# Patient Record
Sex: FEMALE | Race: BLACK OR AFRICAN AMERICAN | NOT HISPANIC OR LATINO | Employment: FULL TIME | URBAN - METROPOLITAN AREA
[De-identification: names, ages, dates, MRNs, and addresses within clinical notes are randomized per-mention and may not be internally consistent; named-entity substitution may affect disease eponyms.]

---

## 2024-05-12 ENCOUNTER — HOSPITAL ENCOUNTER (INPATIENT)
Facility: HOSPITAL | Age: 29
LOS: 3 days | Discharge: HOME | DRG: 812 | End: 2024-05-15
Attending: EMERGENCY MEDICINE | Admitting: SPECIALIST
Payer: MEDICAID

## 2024-05-12 DIAGNOSIS — D57.00 SICKLE CELL ANEMIA WITH PAIN: Primary | ICD-10-CM

## 2024-05-12 PROBLEM — F12.90 MARIJUANA USE: Status: ACTIVE | Noted: 2024-05-12

## 2024-05-12 PROBLEM — D57.1 SICKLE CELL ANEMIA: Status: ACTIVE | Noted: 2024-05-12

## 2024-05-12 PROBLEM — F98.8 ADD (ATTENTION DEFICIT DISORDER): Status: ACTIVE | Noted: 2024-05-12

## 2024-05-12 LAB
ALBUMIN SERPL BCP-MCNC: 3.9 G/DL (ref 3.5–5.2)
ALP SERPL-CCNC: 82 U/L (ref 55–135)
ALT SERPL W/O P-5'-P-CCNC: 10 U/L (ref 10–44)
ANION GAP SERPL CALC-SCNC: 8 MMOL/L (ref 8–16)
AST SERPL-CCNC: 23 U/L (ref 10–40)
B-HCG UR QL: NEGATIVE
BASOPHILS # BLD AUTO: 0.13 K/UL (ref 0–0.2)
BASOPHILS NFR BLD: 1.2 % (ref 0–1.9)
BILIRUB SERPL-MCNC: 3 MG/DL (ref 0.1–1)
BILIRUB UR QL STRIP: NEGATIVE
BUN SERPL-MCNC: 6 MG/DL (ref 6–20)
CALCIUM SERPL-MCNC: 9.3 MG/DL (ref 8.7–10.5)
CHLORIDE SERPL-SCNC: 106 MMOL/L (ref 95–110)
CLARITY UR: CLEAR
CO2 SERPL-SCNC: 25 MMOL/L (ref 23–29)
COLOR UR: YELLOW
CREAT SERPL-MCNC: 0.7 MG/DL (ref 0.5–1.4)
DIFFERENTIAL METHOD BLD: ABNORMAL
EOSINOPHIL # BLD AUTO: 0.3 K/UL (ref 0–0.5)
EOSINOPHIL NFR BLD: 2.7 % (ref 0–8)
ERYTHROCYTE [DISTWIDTH] IN BLOOD BY AUTOMATED COUNT: 14.4 % (ref 11.5–14.5)
EST. GFR  (NO RACE VARIABLE): >60 ML/MIN/1.73 M^2
GLUCOSE SERPL-MCNC: 87 MG/DL (ref 70–110)
GLUCOSE UR QL STRIP: NEGATIVE
HCG INTACT+B SERPL-ACNC: <1.2 MIU/ML
HCT VFR BLD AUTO: 31.2 % (ref 37–48.5)
HGB BLD-MCNC: 11.7 G/DL (ref 12–16)
HGB UR QL STRIP: NEGATIVE
IMM GRANULOCYTES # BLD AUTO: 0.05 K/UL (ref 0–0.04)
IMM GRANULOCYTES NFR BLD AUTO: 0.5 % (ref 0–0.5)
KETONES UR QL STRIP: NEGATIVE
LDH SERPL L TO P-CCNC: 304 U/L (ref 110–260)
LEUKOCYTE ESTERASE UR QL STRIP: NEGATIVE
LYMPHOCYTES # BLD AUTO: 4.5 K/UL (ref 1–4.8)
LYMPHOCYTES NFR BLD: 42.9 % (ref 18–48)
MCH RBC QN AUTO: 30.2 PG (ref 27–31)
MCHC RBC AUTO-ENTMCNC: 37.5 G/DL (ref 32–36)
MCV RBC AUTO: 80 FL (ref 82–98)
MONOCYTES # BLD AUTO: 1.1 K/UL (ref 0.3–1)
MONOCYTES NFR BLD: 10.2 % (ref 4–15)
NEUTROPHILS # BLD AUTO: 4.4 K/UL (ref 1.8–7.7)
NEUTROPHILS NFR BLD: 42.5 % (ref 38–73)
NITRITE UR QL STRIP: NEGATIVE
NRBC BLD-RTO: 1 /100 WBC
PH UR STRIP: 5 [PH] (ref 5–8)
PLATELET # BLD AUTO: 243 K/UL (ref 150–450)
PMV BLD AUTO: 9.5 FL (ref 9.2–12.9)
POTASSIUM SERPL-SCNC: 3.4 MMOL/L (ref 3.5–5.1)
PROT SERPL-MCNC: 7.4 G/DL (ref 6–8.4)
PROT UR QL STRIP: NEGATIVE
RBC # BLD AUTO: 3.88 M/UL (ref 4–5.4)
RETICS/RBC NFR AUTO: 4.1 % (ref 0.5–2.5)
SODIUM SERPL-SCNC: 139 MMOL/L (ref 136–145)
SP GR UR STRIP: 1.01 (ref 1–1.03)
URN SPEC COLLECT METH UR: NORMAL
UROBILINOGEN UR STRIP-ACNC: NEGATIVE EU/DL
WBC # BLD AUTO: 10.41 K/UL (ref 3.9–12.7)

## 2024-05-12 PROCEDURE — 99285 EMERGENCY DEPT VISIT HI MDM: CPT | Mod: 25

## 2024-05-12 PROCEDURE — 63600175 PHARM REV CODE 636 W HCPCS

## 2024-05-12 PROCEDURE — 11000001 HC ACUTE MED/SURG PRIVATE ROOM

## 2024-05-12 PROCEDURE — 25000003 PHARM REV CODE 250: Performed by: EMERGENCY MEDICINE

## 2024-05-12 PROCEDURE — 80053 COMPREHEN METABOLIC PANEL: CPT | Performed by: EMERGENCY MEDICINE

## 2024-05-12 PROCEDURE — S5010 5% DEXTROSE AND 0.45% SALINE: HCPCS

## 2024-05-12 PROCEDURE — 83615 LACTATE (LD) (LDH) ENZYME: CPT | Performed by: EMERGENCY MEDICINE

## 2024-05-12 PROCEDURE — 96374 THER/PROPH/DIAG INJ IV PUSH: CPT

## 2024-05-12 PROCEDURE — 84702 CHORIONIC GONADOTROPIN TEST: CPT | Performed by: EMERGENCY MEDICINE

## 2024-05-12 PROCEDURE — 85045 AUTOMATED RETICULOCYTE COUNT: CPT | Performed by: EMERGENCY MEDICINE

## 2024-05-12 PROCEDURE — 25000003 PHARM REV CODE 250

## 2024-05-12 PROCEDURE — 96376 TX/PRO/DX INJ SAME DRUG ADON: CPT

## 2024-05-12 PROCEDURE — 96361 HYDRATE IV INFUSION ADD-ON: CPT

## 2024-05-12 PROCEDURE — 36415 COLL VENOUS BLD VENIPUNCTURE: CPT | Performed by: EMERGENCY MEDICINE

## 2024-05-12 PROCEDURE — 81003 URINALYSIS AUTO W/O SCOPE: CPT | Performed by: EMERGENCY MEDICINE

## 2024-05-12 PROCEDURE — 96375 TX/PRO/DX INJ NEW DRUG ADDON: CPT

## 2024-05-12 PROCEDURE — 85025 COMPLETE CBC W/AUTO DIFF WBC: CPT | Performed by: EMERGENCY MEDICINE

## 2024-05-12 PROCEDURE — 81025 URINE PREGNANCY TEST: CPT

## 2024-05-12 PROCEDURE — 63600175 PHARM REV CODE 636 W HCPCS: Performed by: EMERGENCY MEDICINE

## 2024-05-12 RX ORDER — GLUCAGON 1 MG
1 KIT INJECTION
Status: CANCELLED | OUTPATIENT
Start: 2024-05-12

## 2024-05-12 RX ORDER — OXYCODONE AND ACETAMINOPHEN 7.5; 325 MG/1; MG/1
1 TABLET ORAL EVERY 8 HOURS PRN
Status: ON HOLD | COMMUNITY
Start: 2024-05-02 | End: 2024-05-15 | Stop reason: HOSPADM

## 2024-05-12 RX ORDER — VALACYCLOVIR HYDROCHLORIDE 500 MG/1
500 TABLET, FILM COATED ORAL 2 TIMES DAILY
COMMUNITY
Start: 2023-08-06

## 2024-05-12 RX ORDER — FOLIC ACID 1 MG/1
1 TABLET ORAL DAILY
COMMUNITY

## 2024-05-12 RX ORDER — ACETAMINOPHEN 325 MG/1
650 TABLET ORAL EVERY 6 HOURS PRN
Status: DISCONTINUED | OUTPATIENT
Start: 2024-05-12 | End: 2024-05-15 | Stop reason: HOSPADM

## 2024-05-12 RX ORDER — ERGOCALCIFEROL 1.25 MG/1
50000 CAPSULE ORAL
COMMUNITY
Start: 2024-05-02

## 2024-05-12 RX ORDER — HYDROMORPHONE HCL IN 0.9% NACL 6 MG/30 ML
PATIENT CONTROLLED ANALGESIA SYRINGE INTRAVENOUS CONTINUOUS
Status: DISCONTINUED | OUTPATIENT
Start: 2024-05-12 | End: 2024-05-14

## 2024-05-12 RX ORDER — SODIUM CHLORIDE 0.9 % (FLUSH) 0.9 %
10 SYRINGE (ML) INJECTION EVERY 12 HOURS PRN
Status: CANCELLED | OUTPATIENT
Start: 2024-05-12

## 2024-05-12 RX ORDER — NALOXONE HCL 0.4 MG/ML
0.02 VIAL (ML) INJECTION
Status: CANCELLED | OUTPATIENT
Start: 2024-05-12

## 2024-05-12 RX ORDER — DEXTROSE MONOHYDRATE AND SODIUM CHLORIDE 5; .45 G/100ML; G/100ML
INJECTION, SOLUTION INTRAVENOUS CONTINUOUS
Status: DISCONTINUED | OUTPATIENT
Start: 2024-05-12 | End: 2024-05-15 | Stop reason: HOSPADM

## 2024-05-12 RX ORDER — HYDROMORPHONE HYDROCHLORIDE 2 MG/ML
1 INJECTION, SOLUTION INTRAMUSCULAR; INTRAVENOUS; SUBCUTANEOUS
Status: COMPLETED | OUTPATIENT
Start: 2024-05-12 | End: 2024-05-12

## 2024-05-12 RX ORDER — DIPHENHYDRAMINE HCL 25 MG
25 CAPSULE ORAL EVERY 6 HOURS PRN
Status: DISCONTINUED | OUTPATIENT
Start: 2024-05-12 | End: 2024-05-15 | Stop reason: HOSPADM

## 2024-05-12 RX ORDER — NALOXONE HCL 0.4 MG/ML
0.02 VIAL (ML) INJECTION
Status: DISCONTINUED | OUTPATIENT
Start: 2024-05-12 | End: 2024-05-15 | Stop reason: HOSPADM

## 2024-05-12 RX ORDER — MORPHINE SULFATE 4 MG/ML
4 INJECTION, SOLUTION INTRAMUSCULAR; INTRAVENOUS
Status: COMPLETED | OUTPATIENT
Start: 2024-05-12 | End: 2024-05-12

## 2024-05-12 RX ORDER — KETOROLAC TROMETHAMINE 30 MG/ML
15 INJECTION, SOLUTION INTRAMUSCULAR; INTRAVENOUS
Status: COMPLETED | OUTPATIENT
Start: 2024-05-12 | End: 2024-05-12

## 2024-05-12 RX ORDER — IBUPROFEN 200 MG
24 TABLET ORAL
Status: CANCELLED | OUTPATIENT
Start: 2024-05-12

## 2024-05-12 RX ORDER — ONDANSETRON HYDROCHLORIDE 2 MG/ML
4 INJECTION, SOLUTION INTRAVENOUS EVERY 12 HOURS PRN
Status: DISCONTINUED | OUTPATIENT
Start: 2024-05-12 | End: 2024-05-15

## 2024-05-12 RX ORDER — SODIUM CHLORIDE 0.9 % (FLUSH) 0.9 %
10 SYRINGE (ML) INJECTION
Status: DISCONTINUED | OUTPATIENT
Start: 2024-05-12 | End: 2024-05-15 | Stop reason: HOSPADM

## 2024-05-12 RX ORDER — AMOXICILLIN 250 MG
1 CAPSULE ORAL 2 TIMES DAILY
Status: DISCONTINUED | OUTPATIENT
Start: 2024-05-12 | End: 2024-05-15 | Stop reason: HOSPADM

## 2024-05-12 RX ORDER — IBUPROFEN 200 MG
16 TABLET ORAL
Status: CANCELLED | OUTPATIENT
Start: 2024-05-12

## 2024-05-12 RX ORDER — ONDANSETRON HYDROCHLORIDE 2 MG/ML
4 INJECTION, SOLUTION INTRAVENOUS
Status: COMPLETED | OUTPATIENT
Start: 2024-05-12 | End: 2024-05-12

## 2024-05-12 RX ORDER — OXYCODONE AND ACETAMINOPHEN 5; 325 MG/1; MG/1
1 TABLET ORAL EVERY 8 HOURS PRN
COMMUNITY
Start: 2024-03-21 | End: 2024-05-12 | Stop reason: DRUGHIGH

## 2024-05-12 RX ADMIN — SODIUM CHLORIDE 1000 ML: 9 INJECTION, SOLUTION INTRAVENOUS at 06:05

## 2024-05-12 RX ADMIN — ONDANSETRON 4 MG: 2 INJECTION INTRAMUSCULAR; INTRAVENOUS at 06:05

## 2024-05-12 RX ADMIN — Medication: at 02:05

## 2024-05-12 RX ADMIN — HYDROMORPHONE HYDROCHLORIDE 1 MG: 2 INJECTION INTRAMUSCULAR; INTRAVENOUS; SUBCUTANEOUS at 10:05

## 2024-05-12 RX ADMIN — SODIUM CHLORIDE, POTASSIUM CHLORIDE, SODIUM LACTATE AND CALCIUM CHLORIDE 1000 ML: 600; 310; 30; 20 INJECTION, SOLUTION INTRAVENOUS at 06:05

## 2024-05-12 RX ADMIN — DEXTROSE AND SODIUM CHLORIDE: 5; 450 INJECTION, SOLUTION INTRAVENOUS at 02:05

## 2024-05-12 RX ADMIN — KETOROLAC TROMETHAMINE 15 MG: 30 INJECTION, SOLUTION INTRAMUSCULAR at 07:05

## 2024-05-12 RX ADMIN — ONDANSETRON 4 MG: 2 INJECTION INTRAMUSCULAR; INTRAVENOUS at 02:05

## 2024-05-12 RX ADMIN — SODIUM CHLORIDE 1000 ML: 9 INJECTION, SOLUTION INTRAVENOUS at 09:05

## 2024-05-12 RX ADMIN — HYDROMORPHONE HYDROCHLORIDE 1 MG: 2 INJECTION INTRAMUSCULAR; INTRAVENOUS; SUBCUTANEOUS at 08:05

## 2024-05-12 RX ADMIN — MORPHINE SULFATE 4 MG: 4 INJECTION INTRAVENOUS at 06:05

## 2024-05-12 RX ADMIN — SENNOSIDES AND DOCUSATE SODIUM 1 TABLET: 50; 8.6 TABLET ORAL at 08:05

## 2024-05-12 RX ADMIN — ACETAMINOPHEN 650 MG: 325 TABLET ORAL at 06:05

## 2024-05-12 NOTE — ED PROVIDER NOTES
SCRIBE #1 NOTE: I, Elvira Leung, am scribing for, and in the presence of, Adrian Lam MD. I have scribed the entire note.       History     Chief Complaint   Patient presents with    Sickle Cell Pain Crisis     C/o right leg pain since yesterday      Review of patient's allergies indicates:  No Known Allergies      History of Present Illness     HPI    5/12/2024, 6:33 AM  History obtained from the patient      History of Present Illness: Francy Zuleta is a 29 y.o. female patient with a PMHx of sickle-cell anemia and ADD who presents to the Emergency Department for evaluation of sickle-cell pain which onset gradually within the past 24 hours. Pt took oxycodone 7.5 but with no relief. Pt denies being pregnant, having allergies, and knowing the flare-up cause. Pt's last ED visit for same CC was 02-13-24 at [facility]. Symptoms are constant and moderate in severity. No mitigating or exacerbating factors reported. Associated sxs include arm pain, leg pain, and back pain. Patient denies any fever, N/V/D, CP, SOB, abdominal pain, cough, congestion, HA, and all other sxs at this time. No further complaints or concerns at this time.       Arrival mode: Personal vehicle    PCP: No, Primary Doctor        Past Medical History:  No past medical history on file.    Past Surgical History:  No past surgical history on file.      Family History:  No family history on file.    Social History:  Social History     Tobacco Use    Smoking status: Not on file    Smokeless tobacco: Not on file   Substance and Sexual Activity    Alcohol use: Not on file    Drug use: Not on file    Sexual activity: Not on file        Review of Systems     Review of Systems   Constitutional:  Negative for fever.   HENT:  Negative for congestion and sore throat.    Respiratory:  Negative for cough and shortness of breath.    Cardiovascular:  Negative for chest pain.   Gastrointestinal:  Negative for abdominal pain, diarrhea, nausea and vomiting.  "  Genitourinary:  Negative for dysuria.   Musculoskeletal:  Positive for arthralgias (shoulders, arms, legs) and back pain.   Skin:  Negative for rash.   Allergic/Immunologic: Negative for environmental allergies.   Neurological:  Negative for weakness and headaches.   Hematological:  Does not bruise/bleed easily.   All other systems reviewed and are negative.       Physical Exam     Initial Vitals [05/12/24 0613]   BP Pulse Resp Temp SpO2   119/79 69 16 97.8 °F (36.6 °C) 98 %      MAP       --          Physical Exam  Nursing Notes and Vital Signs Reviewed.  Constitutional: Patient is in no acute distress. Well-developed and well-nourished. Appears achy.  Head: Atraumatic. Normocephalic.  Eyes: PERRL. EOM intact. Conjunctivae are not pale. No scleral icterus.  ENT: Mucous membranes are moist. Oropharynx is clear and symmetric.    Neck: Supple. Full ROM. No lymphadenopathy.  Cardiovascular: Regular rate. Regular rhythm. No murmurs, rubs, or gallops. Distal pulses are 2+ and symmetric.  Pulmonary/Chest: No respiratory distress. Clear to auscultation bilaterally. No wheezing or rales.  Abdominal: Soft and non-distended.  There is no tenderness.  No rebound, guarding, or rigidity.  Genitourinary: No CVA tenderness  Musculoskeletal: Moves all extremities. No obvious deformities. No edema.  Skin: Warm and dry. Tattoos on legs.  Neurological:  Alert, awake, and appropriate.  Normal speech.  No acute focal neurological deficits are appreciated.  Psychiatric: Normal affect. Good eye contact. Appropriate in content.     ED Course   Procedures  ED Vital Signs:  Vitals:    05/12/24 0613 05/12/24 0649 05/12/24 0733 05/12/24 0811   BP: 119/79  96/63    Pulse: 69  69    Resp: 16 18  18   Temp: 97.8 °F (36.6 °C)      TempSrc: Oral      SpO2: 98%  99%    Weight: 52 kg (114 lb 9.6 oz)      Height: 5' 2" (1.575 m)       05/12/24 0813 05/12/24 0932 05/12/24 1003 05/12/24 1033   BP: 96/61 99/65 101/67 100/69   Pulse: 67 63 69 62   Resp: " 16 16  18   Temp:       TempSrc:       SpO2: 98% 98% 97% 97%   Weight:       Height:        05/12/24 1052 05/12/24 1141   BP:  101/69   Pulse:  86   Resp: 18 18   Temp:  98 °F (36.7 °C)   TempSrc:  Oral   SpO2:  97%   Weight:     Height:         Abnormal Lab Results:  Labs Reviewed   CBC W/ AUTO DIFFERENTIAL - Abnormal; Notable for the following components:       Result Value    RBC 3.88 (*)     Hemoglobin 11.7 (*)     Hematocrit 31.2 (*)     MCV 80 (*)     MCHC 37.5 (*)     Immature Grans (Abs) 0.05 (*)     Mono # 1.1 (*)     nRBC 1 (*)     All other components within normal limits   COMPREHENSIVE METABOLIC PANEL - Abnormal; Notable for the following components:    Potassium 3.4 (*)     Total Bilirubin 3.0 (*)     All other components within normal limits   RETICULOCYTES - Abnormal; Notable for the following components:    Retic 4.1 (*)     All other components within normal limits   LACTATE DEHYDROGENASE - Abnormal; Notable for the following components:     (*)     All other components within normal limits        All Lab Results:  Results for orders placed or performed during the hospital encounter of 05/12/24   CBC Auto Differential   Result Value Ref Range    WBC 10.41 3.90 - 12.70 K/uL    RBC 3.88 (L) 4.00 - 5.40 M/uL    Hemoglobin 11.7 (L) 12.0 - 16.0 g/dL    Hematocrit 31.2 (L) 37.0 - 48.5 %    MCV 80 (L) 82 - 98 fL    MCH 30.2 27.0 - 31.0 pg    MCHC 37.5 (H) 32.0 - 36.0 g/dL    RDW 14.4 11.5 - 14.5 %    Platelets 243 150 - 450 K/uL    MPV 9.5 9.2 - 12.9 fL    Immature Granulocytes 0.5 0.0 - 0.5 %    Gran # (ANC) 4.4 1.8 - 7.7 K/uL    Immature Grans (Abs) 0.05 (H) 0.00 - 0.04 K/uL    Lymph # 4.5 1.0 - 4.8 K/uL    Mono # 1.1 (H) 0.3 - 1.0 K/uL    Eos # 0.3 0.0 - 0.5 K/uL    Baso # 0.13 0.00 - 0.20 K/uL    nRBC 1 (A) 0 /100 WBC    Gran % 42.5 38.0 - 73.0 %    Lymph % 42.9 18.0 - 48.0 %    Mono % 10.2 4.0 - 15.0 %    Eosinophil % 2.7 0.0 - 8.0 %    Basophil % 1.2 0.0 - 1.9 %    Differential Method  Automated    Comprehensive Metabolic Panel   Result Value Ref Range    Sodium 139 136 - 145 mmol/L    Potassium 3.4 (L) 3.5 - 5.1 mmol/L    Chloride 106 95 - 110 mmol/L    CO2 25 23 - 29 mmol/L    Glucose 87 70 - 110 mg/dL    BUN 6 6 - 20 mg/dL    Creatinine 0.7 0.5 - 1.4 mg/dL    Calcium 9.3 8.7 - 10.5 mg/dL    Total Protein 7.4 6.0 - 8.4 g/dL    Albumin 3.9 3.5 - 5.2 g/dL    Total Bilirubin 3.0 (H) 0.1 - 1.0 mg/dL    Alkaline Phosphatase 82 55 - 135 U/L    AST 23 10 - 40 U/L    ALT 10 10 - 44 U/L    eGFR >60 >60 mL/min/1.73 m^2    Anion Gap 8 8 - 16 mmol/L   Reticulocytes   Result Value Ref Range    Retic 4.1 (H) 0.5 - 2.5 %   Urinalysis, Reflex to Urine Culture Urine, Clean Catch    Specimen: Urine   Result Value Ref Range    Specimen UA Urine, Clean Catch     Color, UA Yellow Yellow, Straw, Adelina    Appearance, UA Clear Clear    pH, UA 5.0 5.0 - 8.0    Specific Gravity, UA 1.015 1.005 - 1.030    Protein, UA Negative Negative    Glucose, UA Negative Negative    Ketones, UA Negative Negative    Bilirubin (UA) Negative Negative    Occult Blood UA Negative Negative    Nitrite, UA Negative Negative    Urobilinogen, UA Negative <2.0 EU/dL    Leukocytes, UA Negative Negative   Lactate Dehydrogenase   Result Value Ref Range     (H) 110 - 260 U/L         Imaging Results:  Imaging Results    None          No EKG ordered.           The Emergency Provider reviewed the vital signs and test results, which are outlined above.     ED Discussion       10:11 AM: Discussed case with Dr. Jones ([facility]). Dr. Jones agrees with current care and management of pt and accepts admission.    Admitting Service: [facility]  Admitting Physician: Dr. Jones  Admit to: Med Surg    10:11 AM: Re-evaluated pt. I have discussed test results, shared treatment plan, and the need for admission with patient and family at bedside. Pt and family express understanding at this time and agree with all information. All  questions answered. Pt and family have no further questions or concerns at this time. Pt is ready for admit.        Medical Decision Making  Amount and/or Complexity of Data Reviewed  External Data Reviewed: notes.     Details: [facility] ED visit on 02-13-24 to confirm last ED visit with same CC and PMHx.  Labs: ordered. Decision-making details documented in ED Course.    Risk  Prescription drug management.  Decision regarding hospitalization.                ED Medication(s):  Medications   sodium chloride 0.9% flush 10 mL (has no administration in time range)   dextrose 5 % and 0.45 % NaCl infusion (has no administration in time range)   senna-docusate 8.6-50 mg per tablet 1 tablet (has no administration in time range)   naloxone 0.4 mg/mL injection 0.02 mg (has no administration in time range)   HYDROmorphone PCA syringe 6 mg/30 mL (0.2 mg/mL) NS (has no administration in time range)   ondansetron injection 4 mg (has no administration in time range)   diphenhydrAMINE capsule 25 mg (has no administration in time range)   morphine injection 4 mg (4 mg Intravenous Given 5/12/24 0649)   ondansetron injection 4 mg (4 mg Intravenous Given 5/12/24 0650)   sodium chloride 0.9% bolus 1,000 mL 1,000 mL (0 mLs Intravenous Stopped 5/12/24 0811)   ketorolac injection 15 mg (15 mg Intravenous Given 5/12/24 0700)   HYDROmorphone (PF) injection 1 mg (1 mg Intravenous Given 5/12/24 0811)   sodium chloride 0.9% bolus 1,000 mL 1,000 mL (0 mLs Intravenous Stopped 5/12/24 1056)   HYDROmorphone (PF) injection 1 mg (1 mg Intravenous Given 5/12/24 1052)       Current Discharge Medication List                  Scribe Attestation:   Scribe #1: I performed the above scribed service and the documentation accurately describes the services I performed. I attest to the accuracy of the note.     Attending:   Physician Attestation Statement for Scribe #1: Do PALACIOS Thuytien Wendy, MD, personally performed the services described in this documentation, as  scribed by Elvira Leung, in my presence, and it is both accurate and complete.           Clinical Impression       ICD-10-CM ICD-9-CM   1. Sickle cell anemia with pain  D57.00 282.62       Disposition:   Disposition: Admitted  Condition: Adrian Mejia Do, MD  05/12/24 4848

## 2024-05-12 NOTE — H&P
[facility] Medicine  History & Physical    Patient Name: Francy Zuleta  MRN: [id number]  Patient Class: OP- Observation  Admission Date: 2024  Attending Physician: Ana Jones MD   Primary Care Provider: Maddie Primary Doctor         Patient information was obtained from patient and ER records.     Subjective:     Principal Problem:Sickle cell anemia with pain    Chief Complaint:   Chief Complaint   Patient presents with    Sickle Cell Pain Crisis     C/o right leg pain since yesterday         HPI: Francy Zuleta a 29 year old female with a past medical history of sickle cell anemia and ADD presents to the ER with complaints of sickle cell crisis pain. Patient reports right leg pain since yesterday, rates 10/10 and describes the pain as constant and stabbing. Patient reports pain is made worse with ambulation and only relieved with pain medication.   Labs: Hemoglobin 11.7, hematocrit 31.2, Retic 4.1, Potassium 3.4,   UA pending   Beta HCG negative   Hospital medicine will admit the patient for obs  Past surgical history includes :  in , D&C in      Heme/Onc Doctor: Savita Arthur NP     No past medical history on file.    No past surgical history on file.    Review of patient's allergies indicates:  No Known Allergies    No current facility-administered medications on file prior to encounter.     Current Outpatient Medications on File Prior to Encounter   Medication Sig    ergocalciferol (ERGOCALCIFEROL) 50,000 unit Cap Take 50,000 Units by mouth every 7 days.    folic acid (FOLVITE) 1 MG tablet Take 1 mg by mouth once daily.    oxyCODONE-acetaminophen (PERCOCET) 7.5-325 mg per tablet Take 1 tablet by mouth every 8 (eight) hours as needed.    valACYclovir (VALTREX) 500 MG tablet Take 500 mg by mouth 2 (two) times daily.    [DISCONTINUED] oxyCODONE-acetaminophen (PERCOCET) 5-325 mg per tablet Take 1 tablet by mouth every 8 (eight) hours as needed.     Family  History    None       Tobacco Use    Smoking status: Not on file    Smokeless tobacco: Not on file   Substance and Sexual Activity    Alcohol use: Not on file    Drug use: Not on file    Sexual activity: Not on file     Review of Systems   Constitutional: Negative.    HENT: Negative.     Respiratory: Negative.     Cardiovascular: Negative.  Negative for chest pain.   Gastrointestinal: Negative.  Negative for constipation, diarrhea, nausea and vomiting.   Endocrine: Negative.    Genitourinary: Negative.    Musculoskeletal:  Positive for gait problem (Reports pain in R leg makes it difficult to walk) and myalgias.   Skin: Negative.    Psychiatric/Behavioral: Negative.       Objective:     Vital Signs (Most Recent):  Temp: 98 °F (36.7 °C) (05/12/24 1141)  Pulse: 86 (05/12/24 1141)  Resp: 18 (05/12/24 1141)  BP: 101/69 (05/12/24 1141)  SpO2: 97 % (05/12/24 1141) Vital Signs (24h Range):  Temp:  [97.8 °F (36.6 °C)-98 °F (36.7 °C)] 98 °F (36.7 °C)  Pulse:  [62-86] 86  Resp:  [16-18] 18  SpO2:  [97 %-99 %] 97 %  BP: ()/(61-79) 101/69     Weight: 52 kg (114 lb 9.6 oz)  Body mass index is 20.96 kg/m².     Physical Exam  Vitals and nursing note reviewed.   Constitutional:       General: She is not in acute distress.     Appearance: She is not ill-appearing.   HENT:      Mouth/Throat:      Mouth: Mucous membranes are moist.      Pharynx: Oropharynx is clear.   Eyes:      Pupils: Pupils are equal, round, and reactive to light.   Cardiovascular:      Rate and Rhythm: Normal rate and regular rhythm.      Heart sounds: No murmur heard.  Pulmonary:      Effort: Pulmonary effort is normal.      Breath sounds: Normal breath sounds. No wheezing.   Abdominal:      General: Bowel sounds are normal. There is no distension.      Palpations: Abdomen is soft.      Tenderness: There is no abdominal tenderness.   Musculoskeletal:         General: Normal range of motion.   Skin:     General: Skin is warm and dry.   Neurological:       General: No focal deficit present.      Mental Status: She is alert and oriented to person, place, and time.              CRANIAL NERVES     CN III, IV, VI   Pupils are equal, round, and reactive to light.       Significant Labs: All pertinent labs within the past 24 hours have been reviewed.  Recent Lab Results         05/12/24  0648        Albumin 3.9       ALP 82       ALT 10       Anion Gap 8       AST 23       Baso # 0.13       Basophil % 1.2       BILIRUBIN TOTAL 3.0  Comment: For infants and newborns, interpretation of results should be based  on gestational age, weight and in agreement with clinical  observations.    Premature Infant recommended reference ranges:  Up to 24 hours.............<8.0 mg/dL  Up to 48 hours............<12.0 mg/dL  3-5 days..................<15.0 mg/dL  6-29 days.................<15.0 mg/dL         BUN 6       Calcium 9.3       Chloride 106       CO2 25       Creatinine 0.7       Differential Method Automated       eGFR >60       Eos # 0.3       Eos % 2.7       Glucose 87       Gran # (ANC) 4.4       Gran % 42.5       Hematocrit 31.2       Hemoglobin 11.7       Immature Grans (Abs) 0.05  Comment: Mild elevation in immature granulocytes is non specific and   can be seen in a variety of conditions including stress response,   acute inflammation, trauma and pregnancy. Correlation with other   laboratory and clinical findings is essential.         Immature Granulocytes 0.5       Lactate Dehydrogenase 304  Comment: Results are increased in hemolyzed samples.       Lymph # 4.5       Lymph % 42.9       MCH 30.2       MCHC 37.5       MCV 80       Mono # 1.1       Mono % 10.2       MPV 9.5       nRBC 1       Platelet Count 243       Potassium 3.4       PROTEIN TOTAL 7.4       RBC 3.88       RDW 14.4       Retic 4.1       Sodium 139       WBC 10.41               Significant Imaging: I have reviewed all pertinent imaging results/findings within the past 24 hours.  Assessment/Plan:     * Sickle  cell anemia with pain  -Hemoglobin 11.7, hematocrit 31.2 stable does not require transfusion at this point   -Retic 4.1   -  -Will trend with morning labs in a.m.   -Beta HCG pending, will start PCA after negative result   -Pain control with PCA   -Nausea meds PRN     Sickle cell anemia  -see plan for sickle cell anemia with pain     Marijuana use  -Reports occasional use  -Educated on importance of cessation    ADD (attention deficit disorder)  -not currently on medications   -stable       VTE Risk Mitigation (From admission, onward)           Ordered     Place BRITTON hose  Until discontinued         05/12/24 1236     Place sequential compression device  Until discontinued         05/12/24 1236     IP VTE LOW RISK PATIENT  Once         05/12/24 1236                         On 05/12/2024, patient should be placed in hospital observation services under my care in collaboration with Dr. Ana Jones     Patient seen and plan of care discussed in collaboration with attending physician            Sandra Lange, JOSE  Department of Hospital Medicine  [facility] - Med Surg

## 2024-05-12 NOTE — HPI
Francy Zuleta a 29 year old female with a past medical history of sickle cell anemia and ADD presents to the ER with complaints of sickle cell crisis pain. Patient reports right leg pain since yesterday, rates 10/10 and describes the pain as constant and stabbing. Patient reports pain is made worse with ambulation and only relieved with pain medication.   Labs: Hemoglobin 11.7, hematocrit 31.2, Retic 4.1, Potassium 3.4,   UA pending   Beta HCG pending - will not start PCA until result comes back negative   Hospital medicine will admit the patient for obs  Past surgical history includes :  in , D&C in      Heme/Onc Doctor: Savita Arthur NP

## 2024-05-12 NOTE — ASSESSMENT & PLAN NOTE
-Hemoglobin 11.7, hematocrit 31.2 stable does not require transfusion at this point   -Retic 4.1   -  -Will trend with morning labs in a.m.   -Beta HCG pending, will start PCA after negative result   -Pain control with PCA   -Nausea meds PRN

## 2024-05-12 NOTE — PLAN OF CARE
[facility] - Med Surg  Discharge Assessment    Primary Care Provider: No, Primary Doctor     Discharge Assessment (most recent)       BRIEF DISCHARGE ASSESSMENT - 05/12/24 6412          Discharge Planning    Assessment Type Discharge Planning Brief Assessment     Resource/Environmental Concerns none     Support Systems Parent     Equipment Currently Used at Home none     Current Living Arrangements home     Patient/Family Anticipates Transition to home     Patient/Family Anticipated Services at Transition none     DME Needed Upon Discharge  none     Discharge Plan A Home with family                     Independent with adls.

## 2024-05-12 NOTE — PHARMACY MED REC
"Admission Medication History     The home medication history was taken by Celso Barraza.    You may go to "Admission" then "Reconcile Home Medications" tabs to review and/or act upon these items.     The home medication list has been updated by the Pharmacy department.   Please read ALL comments highlighted in yellow.   Please address this information as you see fit.    Feel free to contact us if you have any questions or require assistance.      Medications listed below were obtained from: Patient/family and Analytic software- [employer]  (Not in a hospital admission)        Celso Barraza  [id number]    Current Outpatient Medications on File Prior to Encounter   Medication Sig Dispense Refill Last Dose    ergocalciferol (ERGOCALCIFEROL) 50,000 unit Cap Take 50,000 Units by mouth every 7 days.   Past Week    folic acid (FOLVITE) 1 MG tablet Take 1 mg by mouth once daily.   5/11/2024    oxyCODONE-acetaminophen (PERCOCET) 7.5-325 mg per tablet Take 1 tablet by mouth every 8 (eight) hours as needed.   5/12/2024    valACYclovir (VALTREX) 500 MG tablet Take 500 mg by mouth 2 (two) times daily.   not taking   .          "

## 2024-05-12 NOTE — SUBJECTIVE & OBJECTIVE
No past medical history on file.    No past surgical history on file.    Review of patient's allergies indicates:  No Known Allergies    No current facility-administered medications on file prior to encounter.     Current Outpatient Medications on File Prior to Encounter   Medication Sig    ergocalciferol (ERGOCALCIFEROL) 50,000 unit Cap Take 50,000 Units by mouth every 7 days.    folic acid (FOLVITE) 1 MG tablet Take 1 mg by mouth once daily.    oxyCODONE-acetaminophen (PERCOCET) 7.5-325 mg per tablet Take 1 tablet by mouth every 8 (eight) hours as needed.    valACYclovir (VALTREX) 500 MG tablet Take 500 mg by mouth 2 (two) times daily.    [DISCONTINUED] oxyCODONE-acetaminophen (PERCOCET) 5-325 mg per tablet Take 1 tablet by mouth every 8 (eight) hours as needed.     Family History    None       Tobacco Use    Smoking status: Not on file    Smokeless tobacco: Not on file   Substance and Sexual Activity    Alcohol use: Not on file    Drug use: Not on file    Sexual activity: Not on file     Review of Systems   Constitutional: Negative.    HENT: Negative.     Respiratory: Negative.     Cardiovascular: Negative.  Negative for chest pain.   Gastrointestinal: Negative.  Negative for constipation, diarrhea, nausea and vomiting.   Endocrine: Negative.    Genitourinary: Negative.    Musculoskeletal:  Positive for gait problem (Reports pain in R leg makes it difficult to walk) and myalgias.   Skin: Negative.    Psychiatric/Behavioral: Negative.       Objective:     Vital Signs (Most Recent):  Temp: 98 °F (36.7 °C) (05/12/24 1141)  Pulse: 86 (05/12/24 1141)  Resp: 18 (05/12/24 1141)  BP: 101/69 (05/12/24 1141)  SpO2: 97 % (05/12/24 1141) Vital Signs (24h Range):  Temp:  [97.8 °F (36.6 °C)-98 °F (36.7 °C)] 98 °F (36.7 °C)  Pulse:  [62-86] 86  Resp:  [16-18] 18  SpO2:  [97 %-99 %] 97 %  BP: ()/(61-79) 101/69     Weight: 52 kg (114 lb 9.6 oz)  Body mass index is 20.96 kg/m².     Physical Exam  Vitals and nursing note  reviewed.   Constitutional:       General: She is not in acute distress.     Appearance: She is not ill-appearing.   HENT:      Mouth/Throat:      Mouth: Mucous membranes are moist.      Pharynx: Oropharynx is clear.   Eyes:      Pupils: Pupils are equal, round, and reactive to light.   Cardiovascular:      Rate and Rhythm: Normal rate and regular rhythm.      Heart sounds: No murmur heard.  Pulmonary:      Effort: Pulmonary effort is normal.      Breath sounds: Normal breath sounds. No wheezing.   Abdominal:      General: Bowel sounds are normal. There is no distension.      Palpations: Abdomen is soft.      Tenderness: There is no abdominal tenderness.   Musculoskeletal:         General: Normal range of motion.   Skin:     General: Skin is warm and dry.   Neurological:      General: No focal deficit present.      Mental Status: She is alert and oriented to person, place, and time.              CRANIAL NERVES     CN III, IV, VI   Pupils are equal, round, and reactive to light.       Significant Labs: All pertinent labs within the past 24 hours have been reviewed.  Recent Lab Results         05/12/24  0648        Albumin 3.9       ALP 82       ALT 10       Anion Gap 8       AST 23       Baso # 0.13       Basophil % 1.2       BILIRUBIN TOTAL 3.0  Comment: For infants and newborns, interpretation of results should be based  on gestational age, weight and in agreement with clinical  observations.    Premature Infant recommended reference ranges:  Up to 24 hours.............<8.0 mg/dL  Up to 48 hours............<12.0 mg/dL  3-5 days..................<15.0 mg/dL  6-29 days.................<15.0 mg/dL         BUN 6       Calcium 9.3       Chloride 106       CO2 25       Creatinine 0.7       Differential Method Automated       eGFR >60       Eos # 0.3       Eos % 2.7       Glucose 87       Gran # (ANC) 4.4       Gran % 42.5       Hematocrit 31.2       Hemoglobin 11.7       Immature Grans (Abs) 0.05  Comment: Mild elevation  in immature granulocytes is non specific and   can be seen in a variety of conditions including stress response,   acute inflammation, trauma and pregnancy. Correlation with other   laboratory and clinical findings is essential.         Immature Granulocytes 0.5       Lactate Dehydrogenase 304  Comment: Results are increased in hemolyzed samples.       Lymph # 4.5       Lymph % 42.9       MCH 30.2       MCHC 37.5       MCV 80       Mono # 1.1       Mono % 10.2       MPV 9.5       nRBC 1       Platelet Count 243       Potassium 3.4       PROTEIN TOTAL 7.4       RBC 3.88       RDW 14.4       Retic 4.1       Sodium 139       WBC 10.41               Significant Imaging: I have reviewed all pertinent imaging results/findings within the past 24 hours.

## 2024-05-12 NOTE — Clinical Note
Diagnosis: Sickle cell anemia with pain [[id number]]   Future Attending Provider: SARBJIT HEALY [[id number]]

## 2024-05-12 NOTE — PLAN OF CARE
Discussed poc with pt, pt verbalized understanding    Purposeful rounding every 2hours    VS wnl   Fall precautions in place, remains injury free  PCA pump in use  Pain and nausea under control with PRN meds    IVFs-D5 1/2 N/S @125mL  Accurate I&Os  Bed locked at lowest position  Call light within reach    Chart check complete  Will cont with POC

## 2024-05-12 NOTE — ED NOTES
Assumed care of patient. Patient c/o right leg pain 10/10 that started yesterday. States has been taking all rx medications and unsure what precipitating factor is. Pt tried home percocet pta without relief. Pt requesting toradol, physician notified. Pt provided with warm blanket per request.

## 2024-05-13 LAB
ALBUMIN SERPL BCP-MCNC: 3.4 G/DL (ref 3.5–5.2)
ALP SERPL-CCNC: 66 U/L (ref 55–135)
ALT SERPL W/O P-5'-P-CCNC: 9 U/L (ref 10–44)
ANION GAP SERPL CALC-SCNC: 5 MMOL/L (ref 8–16)
AST SERPL-CCNC: 19 U/L (ref 10–40)
BASOPHILS # BLD AUTO: 0.1 K/UL (ref 0–0.2)
BASOPHILS NFR BLD: 0.8 % (ref 0–1.9)
BILIRUB SERPL-MCNC: 3.5 MG/DL (ref 0.1–1)
BUN SERPL-MCNC: 3 MG/DL (ref 6–20)
CALCIUM SERPL-MCNC: 8.9 MG/DL (ref 8.7–10.5)
CHLORIDE SERPL-SCNC: 107 MMOL/L (ref 95–110)
CO2 SERPL-SCNC: 27 MMOL/L (ref 23–29)
CREAT SERPL-MCNC: 0.6 MG/DL (ref 0.5–1.4)
DIFFERENTIAL METHOD BLD: ABNORMAL
EOSINOPHIL # BLD AUTO: 0.2 K/UL (ref 0–0.5)
EOSINOPHIL NFR BLD: 1.5 % (ref 0–8)
ERYTHROCYTE [DISTWIDTH] IN BLOOD BY AUTOMATED COUNT: 14.6 % (ref 11.5–14.5)
EST. GFR  (NO RACE VARIABLE): >60 ML/MIN/1.73 M^2
GLUCOSE SERPL-MCNC: 91 MG/DL (ref 70–110)
HCT VFR BLD AUTO: 28.6 % (ref 37–48.5)
HGB BLD-MCNC: 10.6 G/DL (ref 12–16)
IMM GRANULOCYTES # BLD AUTO: 0.04 K/UL (ref 0–0.04)
IMM GRANULOCYTES NFR BLD AUTO: 0.3 % (ref 0–0.5)
LYMPHOCYTES # BLD AUTO: 2.8 K/UL (ref 1–4.8)
LYMPHOCYTES NFR BLD: 22.9 % (ref 18–48)
MCH RBC QN AUTO: 30.2 PG (ref 27–31)
MCHC RBC AUTO-ENTMCNC: 37.1 G/DL (ref 32–36)
MCV RBC AUTO: 82 FL (ref 82–98)
MONOCYTES # BLD AUTO: 1.6 K/UL (ref 0.3–1)
MONOCYTES NFR BLD: 13 % (ref 4–15)
NEUTROPHILS # BLD AUTO: 7.5 K/UL (ref 1.8–7.7)
NEUTROPHILS NFR BLD: 61.5 % (ref 38–73)
NRBC BLD-RTO: 1 /100 WBC
PHOSPHATE SERPL-MCNC: 3.2 MG/DL (ref 2.7–4.5)
PLATELET # BLD AUTO: 217 K/UL (ref 150–450)
PMV BLD AUTO: 9.6 FL (ref 9.2–12.9)
POTASSIUM SERPL-SCNC: 3.3 MMOL/L (ref 3.5–5.1)
PROT SERPL-MCNC: 6.5 G/DL (ref 6–8.4)
RBC # BLD AUTO: 3.51 M/UL (ref 4–5.4)
SODIUM SERPL-SCNC: 139 MMOL/L (ref 136–145)
WBC # BLD AUTO: 12.12 K/UL (ref 3.9–12.7)

## 2024-05-13 PROCEDURE — 63600175 PHARM REV CODE 636 W HCPCS: Performed by: STUDENT IN AN ORGANIZED HEALTH CARE EDUCATION/TRAINING PROGRAM

## 2024-05-13 PROCEDURE — S5010 5% DEXTROSE AND 0.45% SALINE: HCPCS

## 2024-05-13 PROCEDURE — 27000221 HC OXYGEN, UP TO 24 HOURS

## 2024-05-13 PROCEDURE — 94761 N-INVAS EAR/PLS OXIMETRY MLT: CPT

## 2024-05-13 PROCEDURE — 36415 COLL VENOUS BLD VENIPUNCTURE: CPT

## 2024-05-13 PROCEDURE — 84100 ASSAY OF PHOSPHORUS: CPT

## 2024-05-13 PROCEDURE — 63600175 PHARM REV CODE 636 W HCPCS

## 2024-05-13 PROCEDURE — 85025 COMPLETE CBC W/AUTO DIFF WBC: CPT

## 2024-05-13 PROCEDURE — 99900035 HC TECH TIME PER 15 MIN (STAT)

## 2024-05-13 PROCEDURE — 25000003 PHARM REV CODE 250: Performed by: STUDENT IN AN ORGANIZED HEALTH CARE EDUCATION/TRAINING PROGRAM

## 2024-05-13 PROCEDURE — 11000001 HC ACUTE MED/SURG PRIVATE ROOM

## 2024-05-13 PROCEDURE — 25000003 PHARM REV CODE 250

## 2024-05-13 PROCEDURE — 80053 COMPREHEN METABOLIC PANEL: CPT

## 2024-05-13 RX ORDER — KETOROLAC TROMETHAMINE 30 MG/ML
15 INJECTION, SOLUTION INTRAMUSCULAR; INTRAVENOUS EVERY 6 HOURS
Status: DISCONTINUED | OUTPATIENT
Start: 2024-05-13 | End: 2024-05-14

## 2024-05-13 RX ORDER — FOLIC ACID 1 MG/1
1 TABLET ORAL DAILY
Status: DISCONTINUED | OUTPATIENT
Start: 2024-05-13 | End: 2024-05-15 | Stop reason: HOSPADM

## 2024-05-13 RX ADMIN — DEXTROSE AND SODIUM CHLORIDE: 5; 450 INJECTION, SOLUTION INTRAVENOUS at 03:05

## 2024-05-13 RX ADMIN — ONDANSETRON 4 MG: 2 INJECTION INTRAMUSCULAR; INTRAVENOUS at 07:05

## 2024-05-13 RX ADMIN — KETOROLAC TROMETHAMINE 15 MG: 30 INJECTION, SOLUTION INTRAMUSCULAR; INTRAVENOUS at 12:05

## 2024-05-13 RX ADMIN — FOLIC ACID 1 MG: 1 TABLET ORAL at 09:05

## 2024-05-13 RX ADMIN — Medication: at 04:05

## 2024-05-13 RX ADMIN — DEXTROSE AND SODIUM CHLORIDE: 5; 450 INJECTION, SOLUTION INTRAVENOUS at 09:05

## 2024-05-13 RX ADMIN — ACETAMINOPHEN 650 MG: 325 TABLET ORAL at 07:05

## 2024-05-13 RX ADMIN — KETOROLAC TROMETHAMINE 15 MG: 30 INJECTION, SOLUTION INTRAMUSCULAR; INTRAVENOUS at 06:05

## 2024-05-13 NOTE — PLAN OF CARE
Discussed poc with pt, pt verbalized understanding    Purposeful rounding every 2hours    VS wnl  Cardiac monitoring in use, pt is NSR, tele monitor # ____  Fall precautions in place, remains injury free  Pt c/o pain  Pain under control with PCA hydromorphone  Nausea under control with PRN meds    IVFs  Bed locked at lowest position  Call light within reach    Chart check complete  Will cont with POC

## 2024-05-13 NOTE — PLAN OF CARE
Discussed plan of care with patient and this patient, verbalized understanding.  Patient remains free from injury.  Safety and comfort precautions maintained this shift.   Call light and personal belongings within reach, bed in low position with bed wheels locked.  No s/s of acute distress.   Purposeful rounding continued this shift.  Pain levels are controlled per MD order. IVF infusing.  Cardiac monitoring in place  Diet orders continued,   Blood glucose monitoring continued this shift.  Vital signs continued per order.  Q2 repositioning  Chart and orders review completed.   Patient education about care completed.       Problem: Adult Inpatient Plan of Care  Goal: Plan of Care Review  Outcome: Progressing  Goal: Patient-Specific Goal (Individualized)  Outcome: Progressing  Flowsheets (Taken 5/13/2024 0303)  Individualized Care Needs: NONE  Anxieties, Fears or Concerns: NONE  Patient/Family-Specific Goals (Include Timeframe): NONE  Goal: Absence of Hospital-Acquired Illness or Injury  Outcome: Progressing  Goal: Optimal Comfort and Wellbeing  Outcome: Progressing  Goal: Readiness for Transition of Care  Outcome: Progressing     Problem: Fall Injury Risk  Goal: Absence of Fall and Fall-Related Injury  Outcome: Progressing

## 2024-05-14 LAB
ANION GAP SERPL CALC-SCNC: 8 MMOL/L (ref 8–16)
BASOPHILS # BLD AUTO: 0.13 K/UL (ref 0–0.2)
BASOPHILS NFR BLD: 0.9 % (ref 0–1.9)
BUN SERPL-MCNC: 4 MG/DL (ref 6–20)
CALCIUM SERPL-MCNC: 8.9 MG/DL (ref 8.7–10.5)
CHLORIDE SERPL-SCNC: 103 MMOL/L (ref 95–110)
CO2 SERPL-SCNC: 29 MMOL/L (ref 23–29)
CREAT SERPL-MCNC: 0.6 MG/DL (ref 0.5–1.4)
DIFFERENTIAL METHOD BLD: ABNORMAL
EOSINOPHIL # BLD AUTO: 0.4 K/UL (ref 0–0.5)
EOSINOPHIL NFR BLD: 2.8 % (ref 0–8)
ERYTHROCYTE [DISTWIDTH] IN BLOOD BY AUTOMATED COUNT: 14.5 % (ref 11.5–14.5)
EST. GFR  (NO RACE VARIABLE): >60 ML/MIN/1.73 M^2
GLUCOSE SERPL-MCNC: 84 MG/DL (ref 70–110)
HCT VFR BLD AUTO: 29.3 % (ref 37–48.5)
HGB BLD-MCNC: 10.8 G/DL (ref 12–16)
IMM GRANULOCYTES # BLD AUTO: 0.06 K/UL (ref 0–0.04)
IMM GRANULOCYTES NFR BLD AUTO: 0.4 % (ref 0–0.5)
LYMPHOCYTES # BLD AUTO: 3.5 K/UL (ref 1–4.8)
LYMPHOCYTES NFR BLD: 25.3 % (ref 18–48)
MCH RBC QN AUTO: 30.3 PG (ref 27–31)
MCHC RBC AUTO-ENTMCNC: 36.9 G/DL (ref 32–36)
MCV RBC AUTO: 82 FL (ref 82–98)
MONOCYTES # BLD AUTO: 1.3 K/UL (ref 0.3–1)
MONOCYTES NFR BLD: 9.3 % (ref 4–15)
NEUTROPHILS # BLD AUTO: 8.4 K/UL (ref 1.8–7.7)
NEUTROPHILS NFR BLD: 61.3 % (ref 38–73)
NRBC BLD-RTO: 1 /100 WBC
PHOSPHATE SERPL-MCNC: 3.9 MG/DL (ref 2.7–4.5)
PLATELET # BLD AUTO: 228 K/UL (ref 150–450)
PMV BLD AUTO: 10.1 FL (ref 9.2–12.9)
POTASSIUM SERPL-SCNC: 3.5 MMOL/L (ref 3.5–5.1)
RBC # BLD AUTO: 3.57 M/UL (ref 4–5.4)
SODIUM SERPL-SCNC: 140 MMOL/L (ref 136–145)
WBC # BLD AUTO: 13.73 K/UL (ref 3.9–12.7)

## 2024-05-14 PROCEDURE — 36415 COLL VENOUS BLD VENIPUNCTURE: CPT

## 2024-05-14 PROCEDURE — 63600175 PHARM REV CODE 636 W HCPCS: Performed by: STUDENT IN AN ORGANIZED HEALTH CARE EDUCATION/TRAINING PROGRAM

## 2024-05-14 PROCEDURE — 99900035 HC TECH TIME PER 15 MIN (STAT)

## 2024-05-14 PROCEDURE — 85025 COMPLETE CBC W/AUTO DIFF WBC: CPT

## 2024-05-14 PROCEDURE — S5010 5% DEXTROSE AND 0.45% SALINE: HCPCS

## 2024-05-14 PROCEDURE — 84100 ASSAY OF PHOSPHORUS: CPT

## 2024-05-14 PROCEDURE — 94761 N-INVAS EAR/PLS OXIMETRY MLT: CPT

## 2024-05-14 PROCEDURE — 25000003 PHARM REV CODE 250: Performed by: STUDENT IN AN ORGANIZED HEALTH CARE EDUCATION/TRAINING PROGRAM

## 2024-05-14 PROCEDURE — 80048 BASIC METABOLIC PNL TOTAL CA: CPT | Performed by: STUDENT IN AN ORGANIZED HEALTH CARE EDUCATION/TRAINING PROGRAM

## 2024-05-14 PROCEDURE — 11000001 HC ACUTE MED/SURG PRIVATE ROOM

## 2024-05-14 PROCEDURE — 25000003 PHARM REV CODE 250

## 2024-05-14 PROCEDURE — 63600175 PHARM REV CODE 636 W HCPCS

## 2024-05-14 RX ORDER — OXYCODONE HYDROCHLORIDE 5 MG/1
10 TABLET ORAL EVERY 8 HOURS
Status: DISCONTINUED | OUTPATIENT
Start: 2024-05-14 | End: 2024-05-15

## 2024-05-14 RX ORDER — KETOROLAC TROMETHAMINE 30 MG/ML
30 INJECTION, SOLUTION INTRAMUSCULAR; INTRAVENOUS EVERY 6 HOURS
Qty: 8 ML | Refills: 0 | Status: DISCONTINUED | OUTPATIENT
Start: 2024-05-14 | End: 2024-05-15 | Stop reason: HOSPADM

## 2024-05-14 RX ORDER — OXYCODONE HYDROCHLORIDE 5 MG/1
10 TABLET ORAL EVERY 4 HOURS PRN
Status: DISCONTINUED | OUTPATIENT
Start: 2024-05-14 | End: 2024-05-15 | Stop reason: HOSPADM

## 2024-05-14 RX ORDER — HYDROMORPHONE HYDROCHLORIDE 2 MG/ML
1 INJECTION, SOLUTION INTRAMUSCULAR; INTRAVENOUS; SUBCUTANEOUS EVERY 4 HOURS PRN
Status: DISCONTINUED | OUTPATIENT
Start: 2024-05-14 | End: 2024-05-15 | Stop reason: HOSPADM

## 2024-05-14 RX ADMIN — ONDANSETRON 4 MG: 2 INJECTION INTRAMUSCULAR; INTRAVENOUS at 11:05

## 2024-05-14 RX ADMIN — KETOROLAC TROMETHAMINE 30 MG: 30 INJECTION, SOLUTION INTRAMUSCULAR; INTRAVENOUS at 05:05

## 2024-05-14 RX ADMIN — OXYCODONE HYDROCHLORIDE 10 MG: 5 TABLET ORAL at 10:05

## 2024-05-14 RX ADMIN — KETOROLAC TROMETHAMINE 30 MG: 30 INJECTION, SOLUTION INTRAMUSCULAR; INTRAVENOUS at 11:05

## 2024-05-14 RX ADMIN — KETOROLAC TROMETHAMINE 15 MG: 30 INJECTION, SOLUTION INTRAMUSCULAR; INTRAVENOUS at 12:05

## 2024-05-14 RX ADMIN — DEXTROSE AND SODIUM CHLORIDE: 5; 450 INJECTION, SOLUTION INTRAVENOUS at 10:05

## 2024-05-14 RX ADMIN — OXYCODONE HYDROCHLORIDE 10 MG: 5 TABLET ORAL at 02:05

## 2024-05-14 RX ADMIN — CEFTRIAXONE 1 G: 1 INJECTION, POWDER, FOR SOLUTION INTRAMUSCULAR; INTRAVENOUS at 03:05

## 2024-05-14 RX ADMIN — OXYCODONE HYDROCHLORIDE 10 MG: 5 TABLET ORAL at 12:05

## 2024-05-14 RX ADMIN — KETOROLAC TROMETHAMINE 15 MG: 30 INJECTION, SOLUTION INTRAMUSCULAR; INTRAVENOUS at 11:05

## 2024-05-14 RX ADMIN — KETOROLAC TROMETHAMINE 15 MG: 30 INJECTION, SOLUTION INTRAMUSCULAR; INTRAVENOUS at 05:05

## 2024-05-14 RX ADMIN — SENNOSIDES AND DOCUSATE SODIUM 1 TABLET: 50; 8.6 TABLET ORAL at 09:05

## 2024-05-14 RX ADMIN — DEXTROSE AND SODIUM CHLORIDE: 5; 450 INJECTION, SOLUTION INTRAVENOUS at 12:05

## 2024-05-14 RX ADMIN — FOLIC ACID 1 MG: 1 TABLET ORAL at 09:05

## 2024-05-14 RX ADMIN — Medication: at 12:05

## 2024-05-14 RX ADMIN — OXYCODONE HYDROCHLORIDE 10 MG: 5 TABLET ORAL at 03:05

## 2024-05-14 NOTE — NURSING
PCA pump d/c'd per MD order    Pt to be on oral and  IV pain meds to manage pain     One round of antibiotics given today     Possible d/c tomorrow

## 2024-05-14 NOTE — PLAN OF CARE
Discussed poc with pt, pt verbalized understanding    Purposeful rounding every 2hours    VS wnl  Fall precautions in place, remains injury free  Pt c/o pain and nausea  Pain and nausea under control with PRN meds    IVFs  Accurate I&Os  Abx given as prescribed  Bed locked at lowest position  Call light within reach    Chart check complete  Will cont with POC     Alert and oriented to person, place, time/situation. normal mood and affect. no apparent risk to self or others.

## 2024-05-14 NOTE — PROGRESS NOTES
"[facility] - Telemetry ([facility])  [facility] Medicine  Progress Note     Patient Name:  Francy Zuleta  MRN:  [id number]  Patient Class: IP-Inpatient  Admission Date:  2024   Length of Stay:  2  Attending Physician: Denver Vera MD  Primary Care Provider: Maddie, Primary Doctor    Subjective:      Subsequent Care: Follow up Sickle cell anemia with pain        HPI:   Francy Zuleta a 29 year old female with a past medical history of sickle cell anemia and ADD presents to the ER with complaints of sickle cell crisis pain. Patient reports right leg pain since yesterday, rates 10/10 and describes the pain as constant and stabbing. Patient reports pain is made worse with ambulation and only relieved with pain medication. Labs: hemoglobin 11.7, hematocrit 31.2, Retic 4.1, Potassium 3.4, . Beta HCG negative. Hospital medicine will admit the patient for observation. Past surgical history includes :  in , D&C in          Overview/Hospital Course:  2024  Slow improvement in symptoms. Was able to wean off PCA pump today. Scheduled home medications. PRN IV Dilaudid as breakthrough.     Interval Hx  NAEON    Objective  /60 (BP Location: Left arm, Patient Position: Lying)   Pulse 77   Temp 99.1 °F (37.3 °C) (Oral)   Resp 18   Ht 5' 2" (1.575 m)   Wt 52 kg (114 lb 9.6 oz)   LMP 2024 (Exact Date)   SpO2 97%   Breastfeeding No   BMI 20.96 kg/m²     Intake/Output Summary (Last 24 hours) at 2024 1428  Last data filed at 2024 0711  Gross per 24 hour   Intake 391.9 ml   Output --   Net 391.9 ml       PHYSICAL EXAM  Vitals reviewed  Constitutional:       General: She is not in acute distress.     Appearance: She is not ill-appearing.   HENT:      Mouth/Throat:      Mouth: Mucous membranes are moist.      Pharynx: Oropharynx is clear.   Eyes:      Pupils: Pupils are equal, round, and reactive to light.   Cardiovascular:      Rate and Rhythm: Normal rate and regular rhythm.      " "Heart sounds: No murmur heard.  Pulmonary:      Effort: Pulmonary effort is normal.      Breath sounds: Normal breath sounds. No wheezing.   Abdominal:      General: Bowel sounds are normal. There is no distension.      Palpations: Abdomen is soft.      Tenderness: There is no abdominal tenderness.   Musculoskeletal:         General: Normal range of motion.   Skin:     General: Skin is warm and dry.   Neurological:      General: No focal deficit present.      Mental Status: She is alert and oriented to person, place, and time.     LABS  All labs from the past 24 hours were reviewed.     BMP:   Recent Labs   Lab 05/14/24  0617   GLU 84      K 3.5      CO2 29   BUN 4*   CREATININE 0.6   CALCIUM 8.9     CBC:   Recent Labs   Lab 05/13/24 0710 05/14/24 0617   WBC 12.12 13.73*   HGB 10.6* 10.8*   HCT 28.6* 29.3*    228     CMP:   Recent Labs   Lab 05/13/24  0710 05/14/24 0617    140   K 3.3* 3.5    103   CO2 27 29   GLU 91 84   BUN 3* 4*   CREATININE 0.6 0.6   CALCIUM 8.9 8.9   PROT 6.5  --    ALBUMIN 3.4*  --    BILITOT 3.5*  --    ALKPHOS 66  --    AST 19  --    ALT 9*  --    ANIONGAP 5* 8     Cardiac Markers: No results for input(s): "CKMB", "MYOGLOBIN", "BNP", "TROPISTAT" in the last 48 hours.  Coagulation: No results for input(s): "PT", "INR", "APTT" in the last 48 hours.  Lactic Acid: No results for input(s): "LACTATE" in the last 48 hours.  Magnesium: No results for input(s): "MG" in the last 48 hours.  Troponin: No results for input(s): "TROPONINI", "TROPONINIHS" in the last 48 hours.  TSH:   No results for input(s): "TSH" in the last 4320 hours.  Urine Studies:   No results for input(s): "COLORU", "APPEARANCEUA", "PHUR", "SPECGRAV", "PROTEINUA", "GLUCUA", "KETONESU", "BILIRUBINUA", "OCCULTUA", "NITRITE", "UROBILINOGEN", "LEUKOCYTESUR", "RBCUA", "WBCUA", "BACTERIA", "SQUAMEPITHEL", "HYALINECASTS" in the last 48 hours.    Invalid input(s): "WRIGHTSUR"    IMAGING  All imaging from " the past 24 hours were reviewed.     Imaging Results    None         Assessment/Plan:    * Sickle cell anemia with pain  -Hemoglobin 11.7, hematocrit 31.2 stable does not require transfusion at this point   -Retic 4.1   -  -Will trend with morning labs in a.m.   -Beta HCG pending, will start PCA after negative result   -Pain control with PCA   -Nausea meds PRN     05/14/2024  -weaned off PCA pump today  -monitor PRN IV dilaudid use overnight  -can dc once pain completely controlled by PO     Sickle cell anemia  -see plan for sickle cell anemia with pain      Marijuana use  -Reports occasional use  -Educated on importance of cessation     ADD (attention deficit disorder)  -not currently on medications   -stable      CORE MEASURES:  VTE Risk Mitigation (From admission, onward)           Ordered     Place BRITTON hose  Until discontinued         05/12/24 1236     Place sequential compression device  Until discontinued         05/12/24 1236     IP VTE LOW RISK PATIENT  Once         05/12/24 1236                    Code Status: FULL    Diet: Diet Adult Regular (IDDSI Level 7) Standard Tray    Disposition: pain control       Denver Vera MD  Department of Hospital Medicine   [facility] - Telemetry ([facility])

## 2024-05-14 NOTE — PLAN OF CARE
Discussed plan of care with patient and the patient, verbalized understanding.  Patient remains free from injury.  Safety and confort precautions maintained this shift.   Call light and personal belongings within reach, bed in low position with bed wheels locked.  No s/s of acute distress.   Purposeful rounding continued this shift.  Pain levels are controlled per MD order. IVF infusing.  Cardiac monitoring in place  Diet orders continued,  Blood glucose monitoring continued this shift.  Vital signs continued per order  Chart and orders review completed.   Patient education about care completed.     Problem: Adult Inpatient Plan of Care  Goal: Plan of Care Review  Outcome: Progressing  Goal: Patient-Specific Goal (Individualized)  Outcome: Progressing  Flowsheets (Taken 5/14/2024 4784)  Individualized Care Needs: none at this time  Anxieties, Fears or Concerns: none at this time  Patient/Family-Specific Goals (Include Timeframe): none  Goal: Absence of Hospital-Acquired Illness or Injury  Outcome: Progressing  Goal: Optimal Comfort and Wellbeing  Outcome: Progressing  Goal: Readiness for Transition of Care  Outcome: Progressing     Problem: Fall Injury Risk  Goal: Absence of Fall and Fall-Related Injury  Outcome: Progressing

## 2024-05-15 VITALS
DIASTOLIC BLOOD PRESSURE: 71 MMHG | SYSTOLIC BLOOD PRESSURE: 110 MMHG | OXYGEN SATURATION: 98 % | BODY MASS INDEX: 21.1 KG/M2 | RESPIRATION RATE: 18 BRPM | HEART RATE: 95 BPM | TEMPERATURE: 100 F | WEIGHT: 114.63 LBS | HEIGHT: 62 IN

## 2024-05-15 LAB
BASOPHILS # BLD AUTO: 0.13 K/UL (ref 0–0.2)
BASOPHILS NFR BLD: 1 % (ref 0–1.9)
DIFFERENTIAL METHOD BLD: ABNORMAL
EOSINOPHIL # BLD AUTO: 0.6 K/UL (ref 0–0.5)
EOSINOPHIL NFR BLD: 4.7 % (ref 0–8)
ERYTHROCYTE [DISTWIDTH] IN BLOOD BY AUTOMATED COUNT: 14.3 % (ref 11.5–14.5)
HCT VFR BLD AUTO: 27.5 % (ref 37–48.5)
HGB BLD-MCNC: 10.2 G/DL (ref 12–16)
IMM GRANULOCYTES # BLD AUTO: 0.09 K/UL (ref 0–0.04)
IMM GRANULOCYTES NFR BLD AUTO: 0.7 % (ref 0–0.5)
LYMPHOCYTES # BLD AUTO: 3.8 K/UL (ref 1–4.8)
LYMPHOCYTES NFR BLD: 28.6 % (ref 18–48)
MCH RBC QN AUTO: 30.5 PG (ref 27–31)
MCHC RBC AUTO-ENTMCNC: 37.1 G/DL (ref 32–36)
MCV RBC AUTO: 82 FL (ref 82–98)
MONOCYTES # BLD AUTO: 1.1 K/UL (ref 0.3–1)
MONOCYTES NFR BLD: 8 % (ref 4–15)
NEUTROPHILS # BLD AUTO: 7.6 K/UL (ref 1.8–7.7)
NEUTROPHILS NFR BLD: 57 % (ref 38–73)
NRBC BLD-RTO: 1 /100 WBC
PLATELET # BLD AUTO: 216 K/UL (ref 150–450)
PMV BLD AUTO: 10 FL (ref 9.2–12.9)
RBC # BLD AUTO: 3.34 M/UL (ref 4–5.4)
WBC # BLD AUTO: 13.38 K/UL (ref 3.9–12.7)

## 2024-05-15 PROCEDURE — 63600175 PHARM REV CODE 636 W HCPCS: Performed by: STUDENT IN AN ORGANIZED HEALTH CARE EDUCATION/TRAINING PROGRAM

## 2024-05-15 PROCEDURE — 94761 N-INVAS EAR/PLS OXIMETRY MLT: CPT

## 2024-05-15 PROCEDURE — 25000003 PHARM REV CODE 250: Performed by: STUDENT IN AN ORGANIZED HEALTH CARE EDUCATION/TRAINING PROGRAM

## 2024-05-15 PROCEDURE — 85025 COMPLETE CBC W/AUTO DIFF WBC: CPT

## 2024-05-15 PROCEDURE — 36415 COLL VENOUS BLD VENIPUNCTURE: CPT

## 2024-05-15 PROCEDURE — 25000003 PHARM REV CODE 250

## 2024-05-15 RX ORDER — AMOXICILLIN 250 MG
1 CAPSULE ORAL 2 TIMES DAILY
Start: 2024-05-15

## 2024-05-15 RX ORDER — ONDANSETRON HYDROCHLORIDE 2 MG/ML
8 INJECTION, SOLUTION INTRAVENOUS EVERY 6 HOURS PRN
Status: DISCONTINUED | OUTPATIENT
Start: 2024-05-15 | End: 2024-05-15 | Stop reason: HOSPADM

## 2024-05-15 RX ORDER — CEFDINIR 300 MG/1
300 CAPSULE ORAL 2 TIMES DAILY
Qty: 8 CAPSULE | Refills: 0 | Status: SHIPPED | OUTPATIENT
Start: 2024-05-15 | End: 2024-05-19

## 2024-05-15 RX ORDER — OXYCODONE HYDROCHLORIDE 15 MG/1
15 TABLET ORAL EVERY 8 HOURS PRN
Qty: 42 TABLET | Refills: 0 | Status: SHIPPED | OUTPATIENT
Start: 2024-05-15 | End: 2024-05-29

## 2024-05-15 RX ORDER — OXYCODONE HYDROCHLORIDE 5 MG/1
15 TABLET ORAL EVERY 8 HOURS
Status: DISCONTINUED | OUTPATIENT
Start: 2024-05-15 | End: 2024-05-15 | Stop reason: HOSPADM

## 2024-05-15 RX ADMIN — SENNOSIDES AND DOCUSATE SODIUM 1 TABLET: 50; 8.6 TABLET ORAL at 09:05

## 2024-05-15 RX ADMIN — KETOROLAC TROMETHAMINE 30 MG: 30 INJECTION, SOLUTION INTRAMUSCULAR; INTRAVENOUS at 12:05

## 2024-05-15 RX ADMIN — OXYCODONE HYDROCHLORIDE 15 MG: 5 TABLET ORAL at 02:05

## 2024-05-15 RX ADMIN — OXYCODONE HYDROCHLORIDE 10 MG: 5 TABLET ORAL at 05:05

## 2024-05-15 RX ADMIN — KETOROLAC TROMETHAMINE 30 MG: 30 INJECTION, SOLUTION INTRAMUSCULAR; INTRAVENOUS at 06:05

## 2024-05-15 RX ADMIN — FOLIC ACID 1 MG: 1 TABLET ORAL at 09:05

## 2024-05-15 NOTE — DISCHARGE SUMMARY
"[facility] - [facility] Medicine  Discharge Summary      Patient Name: Francy Zuleta  MRN: [id number]  ERIKA: [id number]  Patient Class: IP- Inpatient  Admission Date: 2024  Hospital Length of Stay: 3 days  Discharge Date and Time:  05/15/2024 1:26 PM  Attending Physician: Denver Vera MD   Discharging Provider: Denver Vera MD  Primary Care Provider: Maddie, Primary Doctor    Primary Care Team: Networked reference to record PCT     HPI:   Francy Zuleta a 29 year old female with a past medical history of sickle cell anemia and ADD presents to the ER with complaints of sickle cell crisis pain. Patient reports right leg pain since yesterday, rates 10/10 and describes the pain as constant and stabbing. Patient reports pain is made worse with ambulation and only relieved with pain medication.   Labs: Hemoglobin 11.7, hematocrit 31.2, Retic 4.1, Potassium 3.4,   UA pending   Beta HCG pending - will not start PCA until result comes back negative   Hospital medicine will admit the patient for obs  Past surgical history includes :  in , D&C in      Heme/Onc Doctor: Savita Arthur NP     * No surgery found *      Hospital Course:   -: Slow improvement in symptoms. Required IV dilaudid.   2024: Was able to wean off PCA pump today. Scheduled home medications. PRN IV Dilaudid as breakthrough.  05/15/2024: Did not require PRN IV Dilaudid, but did require an up-titrated dose of her home pain medications. Prescription sent at discharge.     /66 (BP Location: Right arm, Patient Position: Lying)   Pulse 75   Temp 98.3 °F (36.8 °C) (Oral)   Resp 16   Ht 5' 2" (1.575 m)   Wt 52 kg (114 lb 9.6 oz)   LMP 2024 (Exact Date)   SpO2 98%   Breastfeeding No   BMI 20.96 kg/m²     PHYSICAL EXAM  Vitals Reviewed  GEN: No acute distress, pleasant, body habitus normal  HEENT: atraumatic and normocephalic  CARDS: regular rate and rhythm, no m/g, pulses palpable in LE  PULM: " breathing comfortably on room air, chest symmetric, nonlabored, no abnormal breath sounds on auscultation  ABD: nontender, nondistended, soft, no organomegaly, BS+  Neuro: Alert and oriented x3, CN's I-IX grossly intact, sensation and motor intact; follows directions and answers questions appropriately       Goals of Care Treatment Preferences:  Code Status: Full Code      Consults:     No new Assessment & Plan notes have been filed under this hospital service since the last note was generated.  Service: Hospital Medicine    Final Active Diagnoses:    Diagnosis Date Noted POA    PRINCIPAL PROBLEM:  Sickle cell anemia with pain [D57.00] 05/12/2024 Yes    ADD (attention deficit disorder) [F98.8] 05/12/2024 Yes    Marijuana use [F12.90] 05/12/2024 Yes    Sickle cell anemia [D57.1] 05/12/2024 Yes      Problems Resolved During this Admission:       Discharged Condition: good    Disposition: Home or Self Care    Follow Up:   Follow-up Information       Savita Arthur, NP Follow up.    Contact information:  [facility]  [address] [place] [address]  Get Directions  Phone: [phone]  Fax: [phone]                         Patient Instructions:   No discharge procedures on file.    Significant Diagnostic Studies: Labs: All labs within the past 24 hours have been reviewed    Pending Diagnostic Studies:       None           Medications:  Reconciled Home Medications:      Medication List        START taking these medications      cefdinir 300 MG capsule  Commonly known as: OMNICEF  Take 1 capsule (300 mg total) by mouth 2 (two) times daily. for 4 days     oxyCODONE 15 MG Tab  Commonly known as: ROXICODONE  Take 1 tablet (15 mg total) by mouth every 8 (eight) hours as needed for Pain.     senna-docusate 8.6-50 mg 8.6-50 mg per tablet  Commonly known as: PERICOLACE  Take 1 tablet by mouth 2 (two) times daily.            CONTINUE taking these medications       ergocalciferol 50,000 unit Cap  Commonly known as: ERGOCALCIFEROL  Take 50,000 Units by mouth every 7 days.     folic acid 1 MG tablet  Commonly known as: FOLVITE  Take 1 mg by mouth once daily.     valACYclovir 500 MG tablet  Commonly known as: VALTREX  Take 500 mg by mouth 2 (two) times daily.            STOP taking these medications      oxyCODONE-acetaminophen 7.5-325 mg per tablet  Commonly known as: PERCOCET              Indwelling Lines/Drains at time of discharge:   Lines/Drains/Airways       None                   Time spent on the discharge of patient: 25 minutes  of time spent on discharge including examining patient, providing discharge instructions, arranging follow-up and documentation.           Denver Vera MD  Department of Hospital Medicine  [facility] - Med Surg

## 2024-05-15 NOTE — HOSPITAL COURSE
"5/12-5/13: Slow improvement in symptoms. Required IV dilaudid.   5/14/2024: Was able to wean off PCA pump today. Scheduled home medications. PRN IV Dilaudid as breakthrough.  05/15/2024: Did not require PRN IV Dilaudid, but did require an up-titrated dose of her home pain medications. Prescription sent at discharge.     /66 (BP Location: Right arm, Patient Position: Lying)   Pulse 75   Temp 98.3 °F (36.8 °C) (Oral)   Resp 16   Ht 5' 2" (1.575 m)   Wt 52 kg (114 lb 9.6 oz)   LMP 05/11/2024 (Exact Date)   SpO2 98%   Breastfeeding No   BMI 20.96 kg/m²     PHYSICAL EXAM  Vitals Reviewed  GEN: No acute distress, pleasant, body habitus normal  HEENT: atraumatic and normocephalic  CARDS: regular rate and rhythm, no m/g, pulses palpable in LE  PULM: breathing comfortably on room air, chest symmetric, nonlabored, no abnormal breath sounds on auscultation  ABD: nontender, nondistended, soft, no organomegaly, BS+  Neuro: Alert and oriented x3, CN's I-IX grossly intact, sensation and motor intact; follows directions and answers questions appropriately    "

## 2024-05-15 NOTE — PLAN OF CARE
[facility] - Med Surg  Discharge Final Note    Primary Care Provider: No, Primary Doctor    Expected Discharge Date: 5/15/2024    Final Discharge Note (most recent)       Final Note - 05/15/24 1343          Final Note    Assessment Type Final Discharge Note     Anticipated Discharge Disposition Home or Self Care     Hospital Resources/Appts/Education Provided --   pt doesn't hve an [facility] pcp                               Contact Info       Savita Arthur, NP    [facility]  [address] [place] [id number]  Get Directions  Phone: [phone]  Fax: [phone]       Next Steps: Follow up

## 2024-05-15 NOTE — PLAN OF CARE
Discussed poc with pt, pt verbalized understanding    Purposeful rounding   VS wnl  Fall precautions in place, remains injury free  Pt denies c/o ____  Pain  under control with PRN meds    IVFs infusing    Bed locked at lowest position  Call light within reach    Chart check continued

## 2024-08-31 ENCOUNTER — HOSPITAL ENCOUNTER (INPATIENT)
Facility: HOSPITAL | Age: 29
LOS: 2 days | Discharge: HOME | DRG: 812 | End: 2024-09-03
Attending: EMERGENCY MEDICINE | Admitting: STUDENT IN AN ORGANIZED HEALTH CARE EDUCATION/TRAINING PROGRAM
Payer: MEDICAID

## 2024-08-31 DIAGNOSIS — D57.00 SICKLE CELL ANEMIA WITH PAIN: ICD-10-CM

## 2024-08-31 DIAGNOSIS — R07.9 CHEST PAIN: ICD-10-CM

## 2024-08-31 DIAGNOSIS — D57.00 SICKLE CELL PAIN CRISIS: Primary | ICD-10-CM

## 2024-08-31 DIAGNOSIS — M79.605 LEFT LEG PAIN: ICD-10-CM

## 2024-08-31 LAB
ALBUMIN SERPL BCP-MCNC: 4.3 G/DL (ref 3.5–5.2)
ALP SERPL-CCNC: 71 U/L (ref 55–135)
ALT SERPL W/O P-5'-P-CCNC: 13 U/L (ref 10–44)
ANION GAP SERPL CALC-SCNC: 12 MMOL/L (ref 8–16)
AST SERPL-CCNC: 32 U/L (ref 10–40)
BASOPHILS # BLD AUTO: 0.11 K/UL (ref 0–0.2)
BASOPHILS NFR BLD: 1 % (ref 0–1.9)
BILIRUB SERPL-MCNC: 3.4 MG/DL (ref 0.1–1)
BUN SERPL-MCNC: 7 MG/DL (ref 6–20)
CALCIUM SERPL-MCNC: 9.7 MG/DL (ref 8.7–10.5)
CHLORIDE SERPL-SCNC: 104 MMOL/L (ref 95–110)
CO2 SERPL-SCNC: 24 MMOL/L (ref 23–29)
CREAT SERPL-MCNC: 0.7 MG/DL (ref 0.5–1.4)
DIFFERENTIAL METHOD BLD: ABNORMAL
EOSINOPHIL # BLD AUTO: 0.2 K/UL (ref 0–0.5)
EOSINOPHIL NFR BLD: 1.5 % (ref 0–8)
ERYTHROCYTE [DISTWIDTH] IN BLOOD BY AUTOMATED COUNT: 14.2 % (ref 11.5–14.5)
EST. GFR  (NO RACE VARIABLE): >60 ML/MIN/1.73 M^2
GLUCOSE SERPL-MCNC: 83 MG/DL (ref 70–110)
HCT VFR BLD AUTO: 33.9 % (ref 37–48.5)
HEP C VIRUS HOLD SPECIMEN: NORMAL
HGB BLD-MCNC: 12.6 G/DL (ref 12–16)
IMM GRANULOCYTES # BLD AUTO: 0.05 K/UL (ref 0–0.04)
IMM GRANULOCYTES NFR BLD AUTO: 0.5 % (ref 0–0.5)
LYMPHOCYTES # BLD AUTO: 3.8 K/UL (ref 1–4.8)
LYMPHOCYTES NFR BLD: 34.5 % (ref 18–48)
MCH RBC QN AUTO: 30.1 PG (ref 27–31)
MCHC RBC AUTO-ENTMCNC: 37.2 G/DL (ref 32–36)
MCV RBC AUTO: 81 FL (ref 82–98)
MONOCYTES # BLD AUTO: 1.1 K/UL (ref 0.3–1)
MONOCYTES NFR BLD: 10.3 % (ref 4–15)
NEUTROPHILS # BLD AUTO: 5.8 K/UL (ref 1.8–7.7)
NEUTROPHILS NFR BLD: 52.2 % (ref 38–73)
NRBC BLD-RTO: 1 /100 WBC
PLATELET # BLD AUTO: 275 K/UL (ref 150–450)
PMV BLD AUTO: 9.9 FL (ref 9.2–12.9)
POTASSIUM SERPL-SCNC: 3.7 MMOL/L (ref 3.5–5.1)
PROT SERPL-MCNC: 8.2 G/DL (ref 6–8.4)
RBC # BLD AUTO: 4.19 M/UL (ref 4–5.4)
RETICS/RBC NFR AUTO: 3.6 % (ref 0.5–2.5)
SODIUM SERPL-SCNC: 140 MMOL/L (ref 136–145)
WBC # BLD AUTO: 11.07 K/UL (ref 3.9–12.7)

## 2024-08-31 PROCEDURE — 83615 LACTATE (LD) (LDH) ENZYME: CPT | Performed by: EMERGENCY MEDICINE

## 2024-08-31 PROCEDURE — 96374 THER/PROPH/DIAG INJ IV PUSH: CPT

## 2024-08-31 PROCEDURE — 99285 EMERGENCY DEPT VISIT HI MDM: CPT | Mod: 25

## 2024-08-31 PROCEDURE — 63600175 PHARM REV CODE 636 W HCPCS: Performed by: EMERGENCY MEDICINE

## 2024-08-31 PROCEDURE — 80053 COMPREHEN METABOLIC PANEL: CPT | Performed by: EMERGENCY MEDICINE

## 2024-08-31 PROCEDURE — 85025 COMPLETE CBC W/AUTO DIFF WBC: CPT | Performed by: EMERGENCY MEDICINE

## 2024-08-31 PROCEDURE — 85045 AUTOMATED RETICULOCYTE COUNT: CPT | Performed by: EMERGENCY MEDICINE

## 2024-08-31 PROCEDURE — 96376 TX/PRO/DX INJ SAME DRUG ADON: CPT

## 2024-08-31 PROCEDURE — 96375 TX/PRO/DX INJ NEW DRUG ADDON: CPT

## 2024-08-31 PROCEDURE — 86803 HEPATITIS C AB TEST: CPT | Performed by: EMERGENCY MEDICINE

## 2024-08-31 RX ORDER — HYDROMORPHONE HYDROCHLORIDE 1 MG/ML
1 INJECTION, SOLUTION INTRAMUSCULAR; INTRAVENOUS; SUBCUTANEOUS
Status: COMPLETED | OUTPATIENT
Start: 2024-08-31 | End: 2024-08-31

## 2024-08-31 RX ORDER — ONDANSETRON HYDROCHLORIDE 2 MG/ML
4 INJECTION, SOLUTION INTRAVENOUS
Status: COMPLETED | OUTPATIENT
Start: 2024-08-31 | End: 2024-08-31

## 2024-08-31 RX ORDER — FLUCONAZOLE 150 MG/1
TABLET ORAL
COMMUNITY
Start: 2024-08-08 | End: 2024-09-01 | Stop reason: ALTCHOICE

## 2024-08-31 RX ADMIN — HYDROMORPHONE HYDROCHLORIDE 1 MG: 1 INJECTION, SOLUTION INTRAMUSCULAR; INTRAVENOUS; SUBCUTANEOUS at 11:08

## 2024-08-31 RX ADMIN — ONDANSETRON 4 MG: 2 INJECTION INTRAMUSCULAR; INTRAVENOUS at 11:08

## 2024-09-01 PROBLEM — E80.6 HYPERBILIRUBINEMIA: Status: ACTIVE | Noted: 2024-09-01

## 2024-09-01 PROBLEM — Z87.440 HISTORY OF UTI: Status: ACTIVE | Noted: 2024-09-01

## 2024-09-01 LAB
ALBUMIN SERPL BCP-MCNC: 4.1 G/DL (ref 3.5–5.2)
ALP SERPL-CCNC: 67 U/L (ref 55–135)
ALT SERPL W/O P-5'-P-CCNC: 16 U/L (ref 10–44)
ANION GAP SERPL CALC-SCNC: 11 MMOL/L (ref 8–16)
AST SERPL-CCNC: 39 U/L (ref 10–40)
B-HCG UR QL: NEGATIVE
BACTERIA #/AREA URNS HPF: ABNORMAL /HPF
BASOPHILS # BLD AUTO: 0.1 K/UL (ref 0–0.2)
BASOPHILS NFR BLD: 0.7 % (ref 0–1.9)
BILIRUB SERPL-MCNC: 4.4 MG/DL (ref 0.1–1)
BILIRUB UR QL STRIP: NEGATIVE
BUN SERPL-MCNC: 7 MG/DL (ref 6–20)
CALCIUM SERPL-MCNC: 9.2 MG/DL (ref 8.7–10.5)
CHLORIDE SERPL-SCNC: 106 MMOL/L (ref 95–110)
CLARITY UR: CLEAR
CO2 SERPL-SCNC: 21 MMOL/L (ref 23–29)
COLOR UR: ABNORMAL
CREAT SERPL-MCNC: 0.7 MG/DL (ref 0.5–1.4)
DIFFERENTIAL METHOD BLD: ABNORMAL
EOSINOPHIL # BLD AUTO: 0.1 K/UL (ref 0–0.5)
EOSINOPHIL NFR BLD: 0.3 % (ref 0–8)
ERYTHROCYTE [DISTWIDTH] IN BLOOD BY AUTOMATED COUNT: 14.5 % (ref 11.5–14.5)
EST. GFR  (NO RACE VARIABLE): >60 ML/MIN/1.73 M^2
GLUCOSE SERPL-MCNC: 114 MG/DL (ref 70–110)
GLUCOSE UR QL STRIP: NEGATIVE
HCT VFR BLD AUTO: 31.2 % (ref 37–48.5)
HCV AB SERPL QL IA: NEGATIVE
HGB BLD-MCNC: 11.4 G/DL (ref 12–16)
HGB UR QL STRIP: ABNORMAL
HYALINE CASTS #/AREA URNS LPF: 0 /LPF
IMM GRANULOCYTES # BLD AUTO: 0.06 K/UL (ref 0–0.04)
IMM GRANULOCYTES NFR BLD AUTO: 0.4 % (ref 0–0.5)
KETONES UR QL STRIP: NEGATIVE
LDH SERPL L TO P-CCNC: 351 U/L (ref 110–260)
LEUKOCYTE ESTERASE UR QL STRIP: NEGATIVE
LYMPHOCYTES # BLD AUTO: 3.6 K/UL (ref 1–4.8)
LYMPHOCYTES NFR BLD: 25.3 % (ref 18–48)
MCH RBC QN AUTO: 29.6 PG (ref 27–31)
MCHC RBC AUTO-ENTMCNC: 36.5 G/DL (ref 32–36)
MCV RBC AUTO: 81 FL (ref 82–98)
MICROSCOPIC COMMENT: ABNORMAL
MONOCYTES # BLD AUTO: 1.6 K/UL (ref 0.3–1)
MONOCYTES NFR BLD: 11.4 % (ref 4–15)
NEUTROPHILS # BLD AUTO: 8.9 K/UL (ref 1.8–7.7)
NEUTROPHILS NFR BLD: 61.9 % (ref 38–73)
NITRITE UR QL STRIP: NEGATIVE
NRBC BLD-RTO: 0 /100 WBC
PH UR STRIP: 6 [PH] (ref 5–8)
PLATELET # BLD AUTO: 247 K/UL (ref 150–450)
PMV BLD AUTO: 9.7 FL (ref 9.2–12.9)
POTASSIUM SERPL-SCNC: 4.4 MMOL/L (ref 3.5–5.1)
PROT SERPL-MCNC: 8.1 G/DL (ref 6–8.4)
PROT UR QL STRIP: ABNORMAL
RBC # BLD AUTO: 3.85 M/UL (ref 4–5.4)
RBC #/AREA URNS HPF: 75 /HPF (ref 0–4)
SODIUM SERPL-SCNC: 138 MMOL/L (ref 136–145)
SP GR UR STRIP: 1.02 (ref 1–1.03)
SQUAMOUS #/AREA URNS HPF: 4 /HPF
URN SPEC COLLECT METH UR: ABNORMAL
UROBILINOGEN UR STRIP-ACNC: NEGATIVE EU/DL
WBC # BLD AUTO: 14.4 K/UL (ref 3.9–12.7)
WBC #/AREA URNS HPF: 7 /HPF (ref 0–5)

## 2024-09-01 PROCEDURE — 11000001 HC ACUTE MED/SURG PRIVATE ROOM

## 2024-09-01 PROCEDURE — S5010 5% DEXTROSE AND 0.45% SALINE: HCPCS | Performed by: INTERNAL MEDICINE

## 2024-09-01 PROCEDURE — 25000003 PHARM REV CODE 250: Performed by: STUDENT IN AN ORGANIZED HEALTH CARE EDUCATION/TRAINING PROGRAM

## 2024-09-01 PROCEDURE — 85025 COMPLETE CBC W/AUTO DIFF WBC: CPT | Performed by: INTERNAL MEDICINE

## 2024-09-01 PROCEDURE — 96361 HYDRATE IV INFUSION ADD-ON: CPT

## 2024-09-01 PROCEDURE — 96375 TX/PRO/DX INJ NEW DRUG ADDON: CPT

## 2024-09-01 PROCEDURE — 63600175 PHARM REV CODE 636 W HCPCS: Performed by: EMERGENCY MEDICINE

## 2024-09-01 PROCEDURE — 81025 URINE PREGNANCY TEST: CPT | Performed by: EMERGENCY MEDICINE

## 2024-09-01 PROCEDURE — 25000003 PHARM REV CODE 250: Performed by: INTERNAL MEDICINE

## 2024-09-01 PROCEDURE — 80053 COMPREHEN METABOLIC PANEL: CPT | Performed by: INTERNAL MEDICINE

## 2024-09-01 PROCEDURE — 63600175 PHARM REV CODE 636 W HCPCS: Performed by: INTERNAL MEDICINE

## 2024-09-01 PROCEDURE — 81000 URINALYSIS NONAUTO W/SCOPE: CPT | Performed by: EMERGENCY MEDICINE

## 2024-09-01 PROCEDURE — 99900035 HC TECH TIME PER 15 MIN (STAT)

## 2024-09-01 PROCEDURE — 36415 COLL VENOUS BLD VENIPUNCTURE: CPT | Performed by: STUDENT IN AN ORGANIZED HEALTH CARE EDUCATION/TRAINING PROGRAM

## 2024-09-01 PROCEDURE — 96376 TX/PRO/DX INJ SAME DRUG ADON: CPT

## 2024-09-01 PROCEDURE — 87040 BLOOD CULTURE FOR BACTERIA: CPT | Performed by: STUDENT IN AN ORGANIZED HEALTH CARE EDUCATION/TRAINING PROGRAM

## 2024-09-01 RX ORDER — DEXTROSE MONOHYDRATE AND SODIUM CHLORIDE 5; .45 G/100ML; G/100ML
INJECTION, SOLUTION INTRAVENOUS CONTINUOUS
Status: DISCONTINUED | OUTPATIENT
Start: 2024-09-01 | End: 2024-09-03 | Stop reason: HOSPADM

## 2024-09-01 RX ORDER — DIPHENHYDRAMINE HYDROCHLORIDE 50 MG/ML
25 INJECTION INTRAMUSCULAR; INTRAVENOUS
Status: COMPLETED | OUTPATIENT
Start: 2024-09-01 | End: 2024-09-01

## 2024-09-01 RX ORDER — SODIUM CHLORIDE 0.9 % (FLUSH) 0.9 %
10 SYRINGE (ML) INJECTION EVERY 12 HOURS PRN
Status: DISCONTINUED | OUTPATIENT
Start: 2024-09-01 | End: 2024-09-03 | Stop reason: HOSPADM

## 2024-09-01 RX ORDER — KETOROLAC TROMETHAMINE 30 MG/ML
15 INJECTION, SOLUTION INTRAMUSCULAR; INTRAVENOUS
Status: COMPLETED | OUTPATIENT
Start: 2024-09-01 | End: 2024-09-01

## 2024-09-01 RX ORDER — HYDROMORPHONE HYDROCHLORIDE 1 MG/ML
2 INJECTION, SOLUTION INTRAMUSCULAR; INTRAVENOUS; SUBCUTANEOUS
Status: COMPLETED | OUTPATIENT
Start: 2024-09-01 | End: 2024-09-01

## 2024-09-01 RX ORDER — HYDROXYUREA 500 MG/1
1500 CAPSULE ORAL DAILY
Status: DISCONTINUED | OUTPATIENT
Start: 2024-09-01 | End: 2024-09-03 | Stop reason: HOSPADM

## 2024-09-01 RX ORDER — IBUPROFEN 200 MG
16 TABLET ORAL
Status: DISCONTINUED | OUTPATIENT
Start: 2024-09-01 | End: 2024-09-03 | Stop reason: HOSPADM

## 2024-09-01 RX ORDER — NALOXONE HCL 0.4 MG/ML
0.02 VIAL (ML) INJECTION
Status: DISCONTINUED | OUTPATIENT
Start: 2024-09-01 | End: 2024-09-03 | Stop reason: HOSPADM

## 2024-09-01 RX ORDER — DIPHENHYDRAMINE HCL 25 MG
25 CAPSULE ORAL EVERY 6 HOURS PRN
Status: DISCONTINUED | OUTPATIENT
Start: 2024-09-01 | End: 2024-09-03 | Stop reason: HOSPADM

## 2024-09-01 RX ORDER — FOLIC ACID 1 MG/1
1 TABLET ORAL DAILY
Status: DISCONTINUED | OUTPATIENT
Start: 2024-09-01 | End: 2024-09-03 | Stop reason: HOSPADM

## 2024-09-01 RX ORDER — GLUCAGON 1 MG
1 KIT INJECTION
Status: DISCONTINUED | OUTPATIENT
Start: 2024-09-01 | End: 2024-09-03 | Stop reason: HOSPADM

## 2024-09-01 RX ORDER — NALOXONE HCL 0.4 MG/ML
0.02 VIAL (ML) INJECTION
Status: DISCONTINUED | OUTPATIENT
Start: 2024-09-01 | End: 2024-09-01 | Stop reason: SDUPTHER

## 2024-09-01 RX ORDER — HYDROMORPHONE HCL IN 0.9% NACL 6 MG/30 ML
PATIENT CONTROLLED ANALGESIA SYRINGE INTRAVENOUS CONTINUOUS
Status: DISCONTINUED | OUTPATIENT
Start: 2024-09-01 | End: 2024-09-02

## 2024-09-01 RX ORDER — AMOXICILLIN 250 MG
1 CAPSULE ORAL 2 TIMES DAILY
Status: DISCONTINUED | OUTPATIENT
Start: 2024-09-01 | End: 2024-09-03 | Stop reason: HOSPADM

## 2024-09-01 RX ORDER — PROMETHAZINE HYDROCHLORIDE 25 MG/ML
25 INJECTION, SOLUTION INTRAMUSCULAR; INTRAVENOUS EVERY 6 HOURS PRN
Status: DISCONTINUED | OUTPATIENT
Start: 2024-09-01 | End: 2024-09-03 | Stop reason: HOSPADM

## 2024-09-01 RX ORDER — ACETAMINOPHEN 325 MG/1
650 TABLET ORAL EVERY 6 HOURS PRN
Status: DISCONTINUED | OUTPATIENT
Start: 2024-09-01 | End: 2024-09-03 | Stop reason: HOSPADM

## 2024-09-01 RX ORDER — ONDANSETRON HYDROCHLORIDE 2 MG/ML
4 INJECTION, SOLUTION INTRAVENOUS EVERY 6 HOURS PRN
Status: DISCONTINUED | OUTPATIENT
Start: 2024-09-01 | End: 2024-09-03 | Stop reason: HOSPADM

## 2024-09-01 RX ORDER — DIPHENHYDRAMINE HYDROCHLORIDE 50 MG/ML
25 INJECTION INTRAMUSCULAR; INTRAVENOUS EVERY 6 HOURS PRN
Status: DISCONTINUED | OUTPATIENT
Start: 2024-09-01 | End: 2024-09-03 | Stop reason: HOSPADM

## 2024-09-01 RX ORDER — IBUPROFEN 200 MG
24 TABLET ORAL
Status: DISCONTINUED | OUTPATIENT
Start: 2024-09-01 | End: 2024-09-03 | Stop reason: HOSPADM

## 2024-09-01 RX ADMIN — DIPHENHYDRAMINE HYDROCHLORIDE 25 MG: 25 CAPSULE ORAL at 04:09

## 2024-09-01 RX ADMIN — SODIUM CHLORIDE 1000 ML: 0.9 INJECTION, SOLUTION INTRAVENOUS at 03:09

## 2024-09-01 RX ADMIN — HYDROMORPHONE HYDROCHLORIDE 2 MG: 1 INJECTION, SOLUTION INTRAMUSCULAR; INTRAVENOUS; SUBCUTANEOUS at 12:09

## 2024-09-01 RX ADMIN — KETOROLAC TROMETHAMINE 15 MG: 30 INJECTION, SOLUTION INTRAMUSCULAR; INTRAVENOUS at 12:09

## 2024-09-01 RX ADMIN — DIPHENHYDRAMINE HYDROCHLORIDE 25 MG: 50 INJECTION INTRAMUSCULAR; INTRAVENOUS at 01:09

## 2024-09-01 RX ADMIN — SENNOSIDES AND DOCUSATE SODIUM 1 TABLET: 50; 8.6 TABLET ORAL at 09:09

## 2024-09-01 RX ADMIN — DEXTROSE AND SODIUM CHLORIDE: 5; 450 INJECTION, SOLUTION INTRAVENOUS at 04:09

## 2024-09-01 RX ADMIN — DEXTROSE AND SODIUM CHLORIDE: 5; 450 INJECTION, SOLUTION INTRAVENOUS at 10:09

## 2024-09-01 RX ADMIN — Medication: at 04:09

## 2024-09-01 RX ADMIN — HYDROXYUREA 1500 MG: 500 CAPSULE ORAL at 09:09

## 2024-09-01 RX ADMIN — FOLIC ACID 1 MG: 1 TABLET ORAL at 09:09

## 2024-09-01 NOTE — ASSESSMENT & PLAN NOTE
Anemia is likely due to chronic disease due to sickle cell disease type SC . Most recent hemoglobin and hematocrit are listed below.  Recent Labs     08/31/24  2322   HGB 12.6   HCT 33.9*     Plan  - Monitor serial CBC: Daily  - Transfuse PRBC if patient becomes hemodynamically unstable, symptomatic or H/H drops below 7/21.  - Patient has not received any PRBC transfusions to date  - Patient's anemia is currently stable  - white blood cell count 11.07, hemoglobin 12.6, reticulocyte count 3.6, , LFTs with total bilirubin 3.4, hCG negative, afebrile  -left lower extremity ultrasound negative for DVT.  Left hip x-ray negative.  -Dilaudid PCA pump with appropriate monitoring, p.r.n. Narcan, O2 supplementation, incentive spirometry, and IV fluids with D5 half-normal saline at 100 mL/hr  -check urinalysis with reflex culture  -continue home regimen of folic acid and Hydrea

## 2024-09-01 NOTE — SUBJECTIVE & OBJECTIVE
Interval History: f/u with sickle cell pain crisis. Patient is awake and alert, NAD. Patient continues to report 8/10 pain to left leg. Patient repots slight improvement in pain with PCA. Denies any n/v/d, abd pain, CP, or SOB. Labs overall stable continue PCA for pain management     Review of Systems See interval history for ROS     Objective:     Vital Signs (Most Recent):  Temp: 97.9 °F (36.6 °C) (09/01/24 1028)  Pulse: (!) 59 (09/01/24 1611)  Resp: 18 (09/01/24 1028)  BP: 111/73 (09/01/24 1028)  SpO2: 100 % (09/01/24 1028) Vital Signs (24h Range):  Temp:  [97.9 °F (36.6 °C)-98.7 °F (37.1 °C)] 97.9 °F (36.6 °C)  Pulse:  [] 59  Resp:  [12-22] 18  SpO2:  [96 %-100 %] 100 %  BP: (100-122)/(62-77) 111/73     Weight: 51.5 kg (113 lb 8.6 oz)  Body mass index is 20.77 kg/m².    Intake/Output Summary (Last 24 hours) at 9/1/2024 1622  Last data filed at 9/1/2024 0431  Gross per 24 hour   Intake 1000 ml   Output --   Net 1000 ml         Physical Exam  Vitals and nursing note reviewed.   Constitutional:       General: She is not in acute distress.     Appearance: She is not ill-appearing.   HENT:      Mouth/Throat:      Mouth: Mucous membranes are moist.      Pharynx: Oropharynx is clear.   Eyes:      Pupils: Pupils are equal, round, and reactive to light.   Cardiovascular:      Rate and Rhythm: Normal rate and regular rhythm.      Heart sounds: No murmur heard.  Pulmonary:      Effort: Pulmonary effort is normal.      Breath sounds: Normal breath sounds. No wheezing.   Abdominal:      General: Bowel sounds are normal. There is no distension.      Palpations: Abdomen is soft.      Tenderness: There is no abdominal tenderness.   Musculoskeletal:         General: Normal range of motion.   Skin:     General: Skin is warm and dry.   Neurological:      General: No focal deficit present.      Mental Status: She is alert and oriented to person, place, and time.             Significant Labs: All pertinent labs within the past  24 hours have been reviewed.  Recent Lab Results         09/01/24  0346   09/01/24  0005   08/31/24  2322        Albumin 4.1     4.3       ALP 67     71       ALT 16     13       Anion Gap 11     12       Appearance, UA   Clear         AST 39     32       Bacteria, UA   None         Baso # 0.10     0.11       Basophil % 0.7     1.0       Bilirubin (UA)   Negative         BILIRUBIN TOTAL 4.4  Comment: For infants and newborns, interpretation of results should be based  on gestational age, weight and in agreement with clinical  observations.    Premature Infant recommended reference ranges:  Up to 24 hours.............<8.0 mg/dL  Up to 48 hours............<12.0 mg/dL  3-5 days..................<15.0 mg/dL  6-29 days.................<15.0 mg/dL       3.4  Comment: For infants and newborns, interpretation of results should be based  on gestational age, weight and in agreement with clinical  observations.    Premature Infant recommended reference ranges:  Up to 24 hours.............<8.0 mg/dL  Up to 48 hours............<12.0 mg/dL  3-5 days..................<15.0 mg/dL  6-29 days.................<15.0 mg/dL         BUN 7     7       Calcium 9.2     9.7       Chloride 106     104       CO2 21     24       Color, UA   Orange         Creatinine 0.7     0.7       Differential Method Automated     Automated       eGFR >60     >60       Eos # 0.1     0.2       Eos % 0.3     1.5       Glucose 114     83       Glucose, UA   Negative         Gran # (ANC) 8.9     5.8       Gran % 61.9     52.2       Hematocrit 31.2     33.9       Hemoglobin 11.4     12.6       Hepatitis C Ab     Negative       HEP C Virus Hold Specimen     Hold for HCV sendout       Hyaline Casts, UA   0         Immature Grans (Abs) 0.06  Comment: Mild elevation in immature granulocytes is non specific and   can be seen in a variety of conditions including stress response,   acute inflammation, trauma and pregnancy. Correlation with other   laboratory and clinical  findings is essential.       0.05  Comment: Mild elevation in immature granulocytes is non specific and   can be seen in a variety of conditions including stress response,   acute inflammation, trauma and pregnancy. Correlation with other   laboratory and clinical findings is essential.         Immature Granulocytes 0.4     0.5       Ketones, UA   Negative         Lactate Dehydrogenase     351  Comment: Results are increased in hemolyzed samples.       Leukocyte Esterase, UA   Negative         Lymph # 3.6     3.8       Lymph % 25.3     34.5       MCH 29.6     30.1       MCHC 36.5     37.2       MCV 81     81       Microscopic Comment   SEE COMMENT  Comment: Other formed elements not mentioned in the report are not   present in the microscopic examination.            Mono # 1.6     1.1       Mono % 11.4     10.3       MPV 9.7     9.9       NITRITE UA   Negative         nRBC 0     1       Blood, UA   3+         pH, UA   6.0         Platelet Count 247     275       Potassium 4.4     3.7       hCG Qualitative, Urine   Negative         PROTEIN TOTAL 8.1     8.2       Protein, UA   1+  Comment: Recommend a 24 hour urine protein or a urine   protein/creatinine ratio if globulin induced proteinuria is  clinically suspected.           RBC 3.85     4.19       RBC, UA   75         RDW 14.5     14.2       Retic     3.6       Sodium 138     140       Spec Grav UA   1.020         Specimen UA   Urine, Clean Catch         Squam Epithel, UA   4         UROBILINOGEN UA   Negative         WBC, UA   7         WBC 14.40     11.07               Significant Imaging: I have reviewed all pertinent imaging results/findings within the past 24 hours.

## 2024-09-01 NOTE — ASSESSMENT & PLAN NOTE
Related to sickle cell pain crisis  -LFTs with total bilirubin 3.4 and otherwise unremarkable  -no complaints of abdominal pain and no abdominal tenderness to palpation  -check a.m. labs

## 2024-09-01 NOTE — ED PROVIDER NOTES
SCRIBE #1 NOTE: [employer], am scribing for, and in the presence of, Hyun Aguilera DO. I have scribed the HPI, ROS, and PEx.     SCRIBE #2 NOTE: I, Patrica Anderson, am scribing for, and in the presence of,  Damaso Barragan MD. I have scribed the remaining portions of the note not scribed by Scribe #1.      History     Chief Complaint   Patient presents with    Sickle Cell Pain Crisis     Pt report sickle cell pain crisis that began this morning.  Reports pain 10/10 in her left leg. Pain is throbbing and constant.  Pt states she's been at work all day and has been unable to take anything for the pain.      Review of patient's allergies indicates:  No Known Allergies      History of Present Illness     HPI    8/31/2024, 11:05 PM  History obtained from the patient      History of Present Illness: Francy Zuleta is a 29 y.o. female patient who has a PMHx of sickle cell anemia who presents to the Emergency Department for evaluation of sickle cell pain crisis of her left lower extremity which onset this morning. Pt has been at work all day and has been unable to take any tx for the pain. Symptoms are constant and severe in severity. No mitigating or exacerbating factors reported. Patient denies any chest pain, SOB, N/V, fever, and all other sxs at this time. Pt normally takes Toradol, morphine, and oxycodone prescribed by Dr. Perdomo (Hematologist). No further complaints or concerns at this time.       Arrival mode: Personal vehicle    PCP: No, Primary Doctor        Past Medical History:  History reviewed. No pertinent past medical history.    Past Surgical History:  History reviewed. No pertinent surgical history.      Family History:  No family history on file.    Social History:  Social History     Tobacco Use    Smoking status: Never    Smokeless tobacco: Never   Substance and Sexual Activity    Alcohol use: Not Currently    Drug use: Never    Sexual activity: Not Currently        Review of Systems  "    Review of Systems   Constitutional:  Negative for fever.   Respiratory:  Negative for shortness of breath.    Cardiovascular:  Negative for chest pain.   Gastrointestinal:  Negative for nausea and vomiting.   Musculoskeletal:  Positive for myalgias (Left lower extremity).      Physical Exam     Initial Vitals [08/31/24 2211]   BP Pulse Resp Temp SpO2   100/62 78 15 98.7 °F (37.1 °C) 96 %      MAP       --          Physical Exam  Nursing Notes and Vital Signs Reviewed.  Constitutional: Patient is in no acute distress. Well-developed and well-nourished.  Head: Atraumatic. Normocephalic.  Eyes: PERRL. EOM intact. Conjunctivae are not pale. No scleral icterus.  ENT: Mucous membranes are moist. Oropharynx is clear and symmetric.    Neck: Supple. Full ROM. No lymphadenopathy.  Cardiovascular: Regular rate. Regular rhythm. No murmurs, rubs, or gallops. Distal pulses are 2+ and symmetric.  Pulmonary/Chest: No respiratory distress. Clear to auscultation bilaterally. No wheezing or rales.  Abdominal: Soft and non-distended.  There is no tenderness.  No rebound, guarding, or rigidity. Good bowel sounds.  Genitourinary: No CVA tenderness  Musculoskeletal: Moves all extremities. No obvious deformities. No edema. No calf tenderness.  Skin: Warm and dry.  Neurological:  Alert, awake, and appropriate.  Normal speech.  No acute focal neurological deficits are appreciated.  Psychiatric: Normal affect. Good eye contact. Appropriate in content.     ED Course   Procedures  ED Vital Signs:  Vitals:    08/31/24 2211 08/31/24 2315 08/31/24 2344 09/01/24 0001   BP: 100/62   105/69   Pulse: 78  71 77   Resp: 15 16 17 (!) 22   Temp: 98.7 °F (37.1 °C)      TempSrc: Oral      SpO2: 96%   97%   Weight: 51.5 kg (113 lb 8.6 oz)      Height: 5' 2" (1.575 m)       09/01/24 0010 09/01/24 0034 09/01/24 0045 09/01/24 0126   BP:    107/63   Pulse: 87 66  63   Resp: 19  16    Temp:       TempSrc:       SpO2: 98% 98%  100%   Weight:       Height:     "    09/01/24 0243   BP: 122/74   Pulse: 70   Resp: 17   Temp: 98 °F (36.7 °C)   TempSrc: Oral   SpO2: 99%   Weight:    Height:        Abnormal Lab Results:  Labs Reviewed   CBC W/ AUTO DIFFERENTIAL - Abnormal       Result Value    WBC 11.07      RBC 4.19      Hemoglobin 12.6      Hematocrit 33.9 (*)     MCV 81 (*)     MCH 30.1      MCHC 37.2 (*)     RDW 14.2      Platelets 275      MPV 9.9      Immature Granulocytes 0.5      Gran # (ANC) 5.8      Immature Grans (Abs) 0.05 (*)     Lymph # 3.8      Mono # 1.1 (*)     Eos # 0.2      Baso # 0.11      nRBC 1 (*)     Gran % 52.2      Lymph % 34.5      Mono % 10.3      Eosinophil % 1.5      Basophil % 1.0      Differential Method Automated      Narrative:     Release to patient->Immediate   COMPREHENSIVE METABOLIC PANEL - Abnormal    Sodium 140      Potassium 3.7      Chloride 104      CO2 24      Glucose 83      BUN 7      Creatinine 0.7      Calcium 9.7      Total Protein 8.2      Albumin 4.3      Total Bilirubin 3.4 (*)     Alkaline Phosphatase 71      AST 32      ALT 13      eGFR >60      Anion Gap 12      Narrative:     Release to patient->Immediate   RETICULOCYTES - Abnormal    Retic 3.6 (*)     Narrative:     Release to patient->Immediate   LACTATE DEHYDROGENASE - Abnormal     (*)     Narrative:     Release to patient->Immediate   HEPATITIS C ANTIBODY    Hepatitis C Ab Negative      Narrative:     Release to patient->Immediate   HEP C VIRUS HOLD SPECIMEN    HEP C Virus Hold Specimen Hold for HCV sendout      Narrative:     Release to patient->Immediate   PREGNANCY TEST, URINE RAPID    Preg Test, Ur Negative      Narrative:     Specimen Source->Urine   RETICULOCYTES        All Lab Results:  Results for orders placed or performed during the hospital encounter of 08/31/24   Hepatitis C Antibody   Result Value Ref Range    Hepatitis C Ab Negative Negative   HCV Virus Hold Specimen   Result Value Ref Range    HEP C Virus Hold Specimen Hold for HCV sendout    CBC  auto differential   Result Value Ref Range    WBC 11.07 3.90 - 12.70 K/uL    RBC 4.19 4.00 - 5.40 M/uL    Hemoglobin 12.6 12.0 - 16.0 g/dL    Hematocrit 33.9 (L) 37.0 - 48.5 %    MCV 81 (L) 82 - 98 fL    MCH 30.1 27.0 - 31.0 pg    MCHC 37.2 (H) 32.0 - 36.0 g/dL    RDW 14.2 11.5 - 14.5 %    Platelets 275 150 - 450 K/uL    MPV 9.9 9.2 - 12.9 fL    Immature Granulocytes 0.5 0.0 - 0.5 %    Gran # (ANC) 5.8 1.8 - 7.7 K/uL    Immature Grans (Abs) 0.05 (H) 0.00 - 0.04 K/uL    Lymph # 3.8 1.0 - 4.8 K/uL    Mono # 1.1 (H) 0.3 - 1.0 K/uL    Eos # 0.2 0.0 - 0.5 K/uL    Baso # 0.11 0.00 - 0.20 K/uL    nRBC 1 (A) 0 /100 WBC    Gran % 52.2 38.0 - 73.0 %    Lymph % 34.5 18.0 - 48.0 %    Mono % 10.3 4.0 - 15.0 %    Eosinophil % 1.5 0.0 - 8.0 %    Basophil % 1.0 0.0 - 1.9 %    Differential Method Automated    Comprehensive metabolic panel   Result Value Ref Range    Sodium 140 136 - 145 mmol/L    Potassium 3.7 3.5 - 5.1 mmol/L    Chloride 104 95 - 110 mmol/L    CO2 24 23 - 29 mmol/L    Glucose 83 70 - 110 mg/dL    BUN 7 6 - 20 mg/dL    Creatinine 0.7 0.5 - 1.4 mg/dL    Calcium 9.7 8.7 - 10.5 mg/dL    Total Protein 8.2 6.0 - 8.4 g/dL    Albumin 4.3 3.5 - 5.2 g/dL    Total Bilirubin 3.4 (H) 0.1 - 1.0 mg/dL    Alkaline Phosphatase 71 55 - 135 U/L    AST 32 10 - 40 U/L    ALT 13 10 - 44 U/L    eGFR >60 >60 mL/min/1.73 m^2    Anion Gap 12 8 - 16 mmol/L   Reticulocytes   Result Value Ref Range    Retic 3.6 (H) 0.5 - 2.5 %   Lactate Dehydrogenase   Result Value Ref Range     (H) 110 - 260 U/L   Pregnancy, urine rapid (UPT)   Result Value Ref Range    Preg Test, Ur Negative        Imaging Results:  Imaging Results              X-Ray Hip 2 or 3 views Left with Pelvis when performed (Final result)  Result time 09/01/24 01:33:59      Final result by Devon Juan MD (09/01/24 01:33:59)                   Impression:      No acute findings.      Electronically signed by: Devon Juan  Date:    09/01/2024  Time:    01:33                Narrative:    EXAMINATION:  XR HIP WITH PELVIS WHEN PERFORMED 2 OR 3 VIEWS LEFT    CLINICAL HISTORY:  left leg pain;    TECHNIQUE:  AP view of the pelvis and frog leg lateral view of the left hip were performed.    COMPARISON:  None    FINDINGS:  No radiographic evidence of osteonecrosis.  No acute fracture, dislocation, or traumatic malalignment.  Joint spaces are maintained.                                       US Lower Extremity Veins Left (Final result)  Result time 08/31/24 23:13:48      Final result by Devon Juan MD (08/31/24 23:13:48)                   Impression:      No evidence of deep venous thrombosis in the left lower extremity.      Electronically signed by: Devon Juan  Date:    08/31/2024  Time:    23:13               Narrative:    EXAMINATION:  US LOWER EXTREMITY VEINS LEFT    CLINICAL HISTORY:  Pain in left leg    TECHNIQUE:  Duplex and color flow Doppler evaluation and graded compression of the left lower extremity veins was performed.    COMPARISON:  None    FINDINGS:  Left thigh veins: The common femoral, femoral, popliteal, upper greater saphenous, and deep femoral veins are patent and free of thrombus. The veins are normally compressible and have normal phasic flow and augmentation response.    Left calf veins: The visualized calf veins are patent.    Contralateral CFV: The contralateral (right) common femoral vein is patent and free of thrombus.    Miscellaneous: None                                             The Emergency Provider reviewed the vital signs and test results, which are outlined above.     ED Discussion     2:00 AM: Dr. Aguilera transfers care of patient to Dr. Barragan pending evaluation.     3:14 AM: Discussed case with Dr. Dupont ([facility]). Dr. Dupont agrees with current care and management of pt and accepts admission.   Admitting Service: Hospital Medicine  Admitting Physician: Dr. Dupont  Admit to: obs tele     ED Course as of 09/01/24 0438    Sun Sep 01, 2024   0119 Ddx:  Sickle cell anemia with pain, sickle cell pain crisis, avascular necrosis, DVT, cellulitis [NF]   0119 29-year-old female presents with sickle cell pain crisis with an elevation in her LDH and reticulocyte count.  CBC shows no leukocytosis she is afebrile.  Normal H&H.  Pregnancy is negative.  CMP shows elevation in total bilirubin otherwise unremarkable.  Ultrasound negative for DVT and she has no external findings to suggest cellulitis.  Hip x-ray shows no signs of avascular necrosis. [NF]      ED Course User Index  [NF] Hyun Aguilera, DO     Medical Decision Making  Amount and/or Complexity of Data Reviewed  Labs: ordered. Decision-making details documented in ED Course.  Radiology: ordered. Decision-making details documented in ED Course.    Risk  Prescription drug management.                ED Medication(s):  Medications   sodium chloride 0.9% bolus 1,000 mL 1,000 mL (has no administration in time range)   HYDROmorphone injection 1 mg (1 mg Intravenous Given 8/31/24 2315)   ondansetron injection 4 mg (4 mg Intravenous Given 8/31/24 2315)   HYDROmorphone injection 1 mg (1 mg Intravenous Given 8/31/24 2345)   ketorolac injection 15 mg (15 mg Intravenous Given 9/1/24 0015)   HYDROmorphone injection 2 mg (2 mg Intravenous Given 9/1/24 0045)   diphenhydrAMINE injection 25 mg (25 mg Intravenous Given 9/1/24 0130)       New Prescriptions    No medications on file        Follow-up Information       Rachael Perdomo MD On 9/3/2024.    Contact information:  [address] [facility] [address] [phone]               '[facility] - Emergency Dept..    Specialty: Emergency Medicine  Why: As needed, If symptoms worsen  Contact information:  [id number] [facility] [address] [phone]                               Scribe Attestation:   Scribe #1: I performed the above scribed service and the documentation accurately describes the services I performed. I  attest to the accuracy of the note.     Attending:   Physician Attestation Statement for Scribe #1: I, Hyun Aguilera DO, personally performed the services described in this documentation, as scribed by Genesis Stubbs, in my presence, and it is both accurate and complete.       Scribe Attestation:   Scribe #2: I performed the above scribed service and the documentation accurately describes the services I performed. I attest to the accuracy of the note.    Attending Attestation:           Physician Attestation for Scribe:    Physician Attestation Statement for Scribe #2: I, Damaso Barragan MD, reviewed documentation, as scribed by Patrica Anderson in my presence, and it is both accurate and complete. I also acknowledge and confirm the content of the note done by Scribe #1.           Clinical Impression       ICD-10-CM ICD-9-CM   1. Sickle cell pain crisis  D57.00 282.62   2. Left leg pain  M79.605 729.5   3. Sickle cell anemia with pain  D57.00 282.62       Disposition:   Disposition: Placed in Observation  Condition: Fair       Hyun Aguilera DO  09/04/24 180

## 2024-09-01 NOTE — SUBJECTIVE & OBJECTIVE
Past Medical History:   Diagnosis Date    ADD (attention deficit disorder)     Medical marijuana use     Sickle cell disease        History reviewed. No pertinent surgical history.    Review of patient's allergies indicates:  No Known Allergies    No current facility-administered medications on file prior to encounter.     Current Outpatient Medications on File Prior to Encounter   Medication Sig    ergocalciferol (ERGOCALCIFEROL) 50,000 unit Cap Take 50,000 Units by mouth every 7 days.    fluconazole (DIFLUCAN) 150 MG Tab Take one tablet today by mouth and repeat 2nd tablet in 48 hours from the first dose.    folic acid (FOLVITE) 1 MG tablet Take 1 mg by mouth once daily.    senna-docusate 8.6-50 mg (PERICOLACE) 8.6-50 mg per tablet Take 1 tablet by mouth 2 (two) times daily.    valACYclovir (VALTREX) 500 MG tablet Take 500 mg by mouth 2 (two) times daily.     Family History    None       Tobacco Use    Smoking status: Never    Smokeless tobacco: Never   Substance and Sexual Activity    Alcohol use: Not Currently    Drug use: Never    Sexual activity: Not Currently     Review of Systems   Constitutional:  Negative for chills and fever.   Respiratory:  Negative for cough and shortness of breath.    Cardiovascular:  Negative for chest pain.   Gastrointestinal:  Negative for abdominal pain, diarrhea, nausea and vomiting.   Genitourinary:  Negative for dysuria, frequency and hematuria.   Musculoskeletal:         Left leg pain   All other systems reviewed and are negative.    Objective:     Vital Signs (Most Recent):  Temp: 98 °F (36.7 °C) (09/01/24 0243)  Pulse: 70 (09/01/24 0243)  Resp: 17 (09/01/24 0243)  BP: 122/74 (09/01/24 0243)  SpO2: 99 % (09/01/24 0243) Vital Signs (24h Range):  Temp:  [98 °F (36.7 °C)-98.7 °F (37.1 °C)] 98 °F (36.7 °C)  Pulse:  [63-87] 70  Resp:  [15-22] 17  SpO2:  [96 %-100 %] 99 %  BP: (100-122)/(62-74) 122/74     Weight: 51.5 kg (113 lb 8.6 oz)  Body mass index is 20.77 kg/m².     Physical  Exam  Vitals reviewed.   Constitutional:       General: She is not in acute distress.     Appearance: She is not ill-appearing.   HENT:      Nose: Nose normal.   Eyes:      Conjunctiva/sclera: Conjunctivae normal.   Cardiovascular:      Rate and Rhythm: Normal rate and regular rhythm.   Pulmonary:      Effort: Pulmonary effort is normal. No respiratory distress.   Abdominal:      General: There is no distension.      Tenderness: There is no abdominal tenderness.   Musculoskeletal:         General: No swelling or deformity.      Right lower leg: No edema.      Left lower leg: No edema.   Skin:     General: Skin is warm and dry.   Neurological:      Mental Status: She is alert and oriented to person, place, and time.   Psychiatric:         Mood and Affect: Mood normal.         Behavior: Behavior normal.                Significant Labs: All pertinent labs within the past 24 hours have been reviewed.  Recent Lab Results         09/01/24  0005   08/31/24  2322        Albumin   4.3       ALP   71       ALT   13       Anion Gap   12       Appearance, UA Clear         AST   32       Bacteria, UA None         Baso #   0.11       Basophil %   1.0       Bilirubin (UA) Negative         BILIRUBIN TOTAL   3.4  Comment: For infants and newborns, interpretation of results should be based  on gestational age, weight and in agreement with clinical  observations.    Premature Infant recommended reference ranges:  Up to 24 hours.............<8.0 mg/dL  Up to 48 hours............<12.0 mg/dL  3-5 days..................<15.0 mg/dL  6-29 days.................<15.0 mg/dL         BUN   7       Calcium   9.7       Chloride   104       CO2   24       Color, UA Orange         Creatinine   0.7       Differential Method   Automated       eGFR   >60       Eos #   0.2       Eos %   1.5       Glucose   83       Glucose, UA Negative         Gran # (ANC)   5.8       Gran %   52.2       Hematocrit   33.9       Hemoglobin   12.6       Hepatitis C Ab    Negative       HEP C Virus Hold Specimen   Hold for HCV sendout       Hyaline Casts, UA 0         Immature Grans (Abs)   0.05  Comment: Mild elevation in immature granulocytes is non specific and   can be seen in a variety of conditions including stress response,   acute inflammation, trauma and pregnancy. Correlation with other   laboratory and clinical findings is essential.         Immature Granulocytes   0.5       Ketones, UA Negative         Lactate Dehydrogenase   351  Comment: Results are increased in hemolyzed samples.       Leukocyte Esterase, UA Negative         Lymph #   3.8       Lymph %   34.5       MCH   30.1       MCHC   37.2       MCV   81       Microscopic Comment SEE COMMENT  Comment: Other formed elements not mentioned in the report are not   present in the microscopic examination.            Mono #   1.1       Mono %   10.3       MPV   9.9       NITRITE UA Negative         nRBC   1       Blood, UA 3+         pH, UA 6.0         Platelet Count   275       Potassium   3.7       hCG Qualitative, Urine Negative         PROTEIN TOTAL   8.2       Protein, UA 1+  Comment: Recommend a 24 hour urine protein or a urine   protein/creatinine ratio if globulin induced proteinuria is  clinically suspected.           RBC   4.19       RBC, UA 75         RDW   14.2       Retic   3.6       Sodium   140       Spec Grav UA 1.020         Specimen UA Urine, Clean Catch         Squam Epithel, UA 4         UROBILINOGEN UA Negative         WBC, UA 7         WBC   11.07               Significant Imaging: I have reviewed all pertinent imaging results/findings within the past 24 hours.  X-Ray Hip 2 or 3 views Left with Pelvis when performed   Final Result      No acute findings.         Electronically signed by: Devon Juan   Date:    09/01/2024   Time:    01:33      US Lower Extremity Veins Left   Final Result      No evidence of deep venous thrombosis in the left lower extremity.         Electronically signed  by: Devon Juan   Date:    08/31/2024   Time:    23:13

## 2024-09-01 NOTE — PROGRESS NOTES
[facility] Medicine  Progress Note    Patient Name: Francy Zuleta  MRN: [id number]  Patient Class: IP- Inpatient   Admission Date: 8/31/2024  Length of Stay: 0 days  Attending Physician: Sandeep Hillman,*  Primary Care Provider: Maddie, Primary Doctor        Subjective:     Principal Problem:Sickle cell pain crisis        HPI:  29-year-old  woman with history of sickle cell disease type SC, ADD, THC use (prescribed), HSV infection, bacterial vaginosis, UTI, and COVID-19 infection who presented to the emergency department with complaint of left lower extremity pain that began yesterday morning, constant, 10/10 on the pain scale, and described as throbbing.  This is typical of her usual sickle cell crisis.  She was unable to take any pain medications as she was at work all day.  No pain elsewhere.  She denies any associated nausea, vomiting, fevers, chills, diarrhea, constipation, dysuria, hematuria, chest pain, shortness for breath, or cough.  Patient received a total of 4 mg of IV Dilaudid, Benadryl 25 mg IV, and Toradol 15 mg IV while in the emergency department with no relief in her symptoms.    Patient was last seen in Hematology Oncology Clinic 07/10/2024 and initiated on Hydrea due to more frequent sickle cell pain events.  She was also referred to Palliative Care for medical marijuana management.  Patient is followed by Hematology Oncology Dr. Perdomo.    Last hospital admit 5/12-05/15/2024 for sickle cell pain crisis with complaint of leg pain.    Overview/Hospital Course:  30 y/o female admitted for sickle cell pain crisis   H/H 11.4/31.2, Retic count 3.6, Bili total 4.4,    X-ray hip with pelvis: No acute findings.  US lower extremity: No evidence of deep venous thrombosis in the left lower extremity.   PCA and IVF for pain management     Interval History: f/u with sickle cell pain crisis. Patient is awake and alert, NAD. Patient continues to report 8/10 pain to  left leg. Patient repots slight improvement in pain with PCA. Denies any n/v/d, abd pain, CP, or SOB. Labs overall stable continue PCA for pain management     Review of Systems See interval history for ROS     Objective:     Vital Signs (Most Recent):  Temp: 97.9 °F (36.6 °C) (09/01/24 1028)  Pulse: (!) 59 (09/01/24 1611)  Resp: 18 (09/01/24 1028)  BP: 111/73 (09/01/24 1028)  SpO2: 100 % (09/01/24 1028) Vital Signs (24h Range):  Temp:  [97.9 °F (36.6 °C)-98.7 °F (37.1 °C)] 97.9 °F (36.6 °C)  Pulse:  [] 59  Resp:  [12-22] 18  SpO2:  [96 %-100 %] 100 %  BP: (100-122)/(62-77) 111/73     Weight: 51.5 kg (113 lb 8.6 oz)  Body mass index is 20.77 kg/m².    Intake/Output Summary (Last 24 hours) at 9/1/2024 1622  Last data filed at 9/1/2024 0431  Gross per 24 hour   Intake 1000 ml   Output --   Net 1000 ml         Physical Exam  Vitals and nursing note reviewed.   Constitutional:       General: She is not in acute distress.     Appearance: She is not ill-appearing.   HENT:      Mouth/Throat:      Mouth: Mucous membranes are moist.      Pharynx: Oropharynx is clear.   Eyes:      Pupils: Pupils are equal, round, and reactive to light.   Cardiovascular:      Rate and Rhythm: Normal rate and regular rhythm.      Heart sounds: No murmur heard.  Pulmonary:      Effort: Pulmonary effort is normal.      Breath sounds: Normal breath sounds. No wheezing.   Abdominal:      General: Bowel sounds are normal. There is no distension.      Palpations: Abdomen is soft.      Tenderness: There is no abdominal tenderness.   Musculoskeletal:         General: Normal range of motion.   Skin:     General: Skin is warm and dry.   Neurological:      General: No focal deficit present.      Mental Status: She is alert and oriented to person, place, and time.             Significant Labs: All pertinent labs within the past 24 hours have been reviewed.  Recent Lab Results         09/01/24  0346   09/01/24  0005   08/31/24  8232        Albumin  4.1     4.3       ALP 67     71       ALT 16     13       Anion Gap 11     12       Appearance, UA   Clear         AST 39     32       Bacteria, UA   None         Baso # 0.10     0.11       Basophil % 0.7     1.0       Bilirubin (UA)   Negative         BILIRUBIN TOTAL 4.4  Comment: For infants and newborns, interpretation of results should be based  on gestational age, weight and in agreement with clinical  observations.    Premature Infant recommended reference ranges:  Up to 24 hours.............<8.0 mg/dL  Up to 48 hours............<12.0 mg/dL  3-5 days..................<15.0 mg/dL  6-29 days.................<15.0 mg/dL       3.4  Comment: For infants and newborns, interpretation of results should be based  on gestational age, weight and in agreement with clinical  observations.    Premature Infant recommended reference ranges:  Up to 24 hours.............<8.0 mg/dL  Up to 48 hours............<12.0 mg/dL  3-5 days..................<15.0 mg/dL  6-29 days.................<15.0 mg/dL         BUN 7     7       Calcium 9.2     9.7       Chloride 106     104       CO2 21     24       Color, UA   Orange         Creatinine 0.7     0.7       Differential Method Automated     Automated       eGFR >60     >60       Eos # 0.1     0.2       Eos % 0.3     1.5       Glucose 114     83       Glucose, UA   Negative         Gran # (ANC) 8.9     5.8       Gran % 61.9     52.2       Hematocrit 31.2     33.9       Hemoglobin 11.4     12.6       Hepatitis C Ab     Negative       HEP C Virus Hold Specimen     Hold for HCV sendout       Hyaline Casts, UA   0         Immature Grans (Abs) 0.06  Comment: Mild elevation in immature granulocytes is non specific and   can be seen in a variety of conditions including stress response,   acute inflammation, trauma and pregnancy. Correlation with other   laboratory and clinical findings is essential.       0.05  Comment: Mild elevation in immature granulocytes is non specific and   can be seen in  a variety of conditions including stress response,   acute inflammation, trauma and pregnancy. Correlation with other   laboratory and clinical findings is essential.         Immature Granulocytes 0.4     0.5       Ketones, UA   Negative         Lactate Dehydrogenase     351  Comment: Results are increased in hemolyzed samples.       Leukocyte Esterase, UA   Negative         Lymph # 3.6     3.8       Lymph % 25.3     34.5       MCH 29.6     30.1       MCHC 36.5     37.2       MCV 81     81       Microscopic Comment   SEE COMMENT  Comment: Other formed elements not mentioned in the report are not   present in the microscopic examination.            Mono # 1.6     1.1       Mono % 11.4     10.3       MPV 9.7     9.9       NITRITE UA   Negative         nRBC 0     1       Blood, UA   3+         pH, UA   6.0         Platelet Count 247     275       Potassium 4.4     3.7       hCG Qualitative, Urine   Negative         PROTEIN TOTAL 8.1     8.2       Protein, UA   1+  Comment: Recommend a 24 hour urine protein or a urine   protein/creatinine ratio if globulin induced proteinuria is  clinically suspected.           RBC 3.85     4.19       RBC, UA   75         RDW 14.5     14.2       Retic     3.6       Sodium 138     140       Spec Grav UA   1.020         Specimen UA   Urine, Clean Catch         Squam Epithel, UA   4         UROBILINOGEN UA   Negative         WBC, UA   7         WBC 14.40     11.07               Significant Imaging: I have reviewed all pertinent imaging results/findings within the past 24 hours.    Assessment/Plan:      * Sickle cell pain crisis  Anemia is likely due to chronic disease due to sickle cell disease type SC . Most recent hemoglobin and hematocrit are listed below.  Recent Labs     08/31/24  2322 09/01/24  0346   HGB 12.6 11.4*   HCT 33.9* 31.2*       Plan  - Monitor serial CBC: Daily  - Transfuse PRBC if patient becomes hemodynamically unstable, symptomatic or H/H drops below 7/21.  - Patient  has not received any PRBC transfusions to date  - Patient's anemia is currently stable  - white blood cell count 11.07, hemoglobin 12.6, reticulocyte count 3.6, , LFTs with total bilirubin 3.4, hCG negative, afebrile  -left lower extremity ultrasound negative for DVT.  Left hip x-ray negative.  -Dilaudid PCA pump with appropriate monitoring, p.r.n. Narcan, O2 supplementation, incentive spirometry, and IV fluids with D5 half-normal saline at 100 mL/hr  -check urinalysis with reflex culture, +7 WBCs 3+ blood and +1 protein   -continue home regimen of folic acid and Hydrea    History of UTI  -check urinalysis with reflex culture, +7 WBCs 3+ blood and +1 protein       Hyperbilirubinemia  Related to sickle cell pain crisis  -LFTs with total bilirubin 3.4 and otherwise unremarkable  -no complaints of abdominal pain and no abdominal tenderness to palpation  -check a.m. labs      Marijuana use  Patient was referred by Hematology Oncology to palliative Care for medical marijuana management at most recent visit 07/10/2024      ADD (attention deficit disorder)  Currently on no ADD medications        VTE Risk Mitigation (From admission, onward)           Ordered     IP VTE LOW RISK PATIENT  Once         09/01/24 0332     Place sequential compression device  Until discontinued         09/01/24 0332                    Discharge Planning   XUAN:      Code Status: Full Code   Is the patient medically ready for discharge?:     Reason for patient still in hospital (select all that apply): Patient trending condition, Laboratory test, and Treatment             The patient's case has been reviewed by my supervising physician.   Supervising physician will provide additional orders and recommendations at his/her discretion.  Please see supervising physicians documentation and/or orders for further details.           Sandra Lange NP  Department of Hospital Medicine   [facility] - Med Surg

## 2024-09-01 NOTE — ASSESSMENT & PLAN NOTE
Anemia is likely due to chronic disease due to sickle cell disease type SC . Most recent hemoglobin and hematocrit are listed below.  Recent Labs     08/31/24  2322 09/01/24  0346   HGB 12.6 11.4*   HCT 33.9* 31.2*       Plan  - Monitor serial CBC: Daily  - Transfuse PRBC if patient becomes hemodynamically unstable, symptomatic or H/H drops below 7/21.  - Patient has not received any PRBC transfusions to date  - Patient's anemia is currently stable  - white blood cell count 11.07, hemoglobin 12.6, reticulocyte count 3.6, , LFTs with total bilirubin 3.4, hCG negative, afebrile  -left lower extremity ultrasound negative for DVT.  Left hip x-ray negative.  -Dilaudid PCA pump with appropriate monitoring, p.r.n. Narcan, O2 supplementation, incentive spirometry, and IV fluids with D5 half-normal saline at 100 mL/hr  -check urinalysis with reflex culture, +7 WBCs 3+ blood and +1 protein   -continue home regimen of folic acid and Hydrea

## 2024-09-01 NOTE — H&P
Formerly [facility] - Emergency Dept.  [facility]  History & Physical    Patient Name: Francy Zuleta  MRN: [id number]  Patient Class: OP- Observation  Admission Date: 8/31/2024  Attending Physician:  Domitila Dupont MD  Primary Care Provider: Maddie, Primary Doctor         Patient information was obtained from patient, ER records, and ER physician .     Subjective:     Principal Problem:Sickle cell pain crisis    Chief Complaint:   Chief Complaint   Patient presents with    Sickle Cell Pain Crisis     Pt report sickle cell pain crisis that began this morning.  Reports pain 10/10 in her left leg. Pain is throbbing and constant.  Pt states she's been at work all day and has been unable to take anything for the pain.         HPI: 29-year-old  woman with history of sickle cell disease type SC, ADD, THC use (prescribed), HSV infection, bacterial vaginosis, UTI, and COVID-19 infection who presented to the emergency department with complaint of left lower extremity pain that began yesterday morning, constant, 10/10 on the pain scale, and described as throbbing.  This is typical of her usual sickle cell crisis.  She was unable to take any pain medications as she was at work all day.  No pain elsewhere.  She denies any associated nausea, vomiting, fevers, chills, diarrhea, constipation, dysuria, hematuria, chest pain, shortness for breath, or cough.  Patient received a total of 4 mg of IV Dilaudid, Benadryl 25 mg IV, and Toradol 15 mg IV while in the emergency department with no relief in her symptoms.    Patient was last seen in Hematology Oncology Clinic 07/10/2024 and initiated on Hydrea due to more frequent sickle cell pain events.  She was also referred to Palliative Care for medical marijuana management.  Patient is followed by Hematology Oncology Dr. Perdomo.    Last hospital admit 5/12-05/15/2024 for sickle cell pain crisis with complaint of leg pain.    Past Medical History:   Diagnosis Date    ADD (attention  deficit disorder)     Medical marijuana use     Sickle cell disease        History reviewed. No pertinent surgical history.    Review of patient's allergies indicates:  No Known Allergies    No current facility-administered medications on file prior to encounter.     Current Outpatient Medications on File Prior to Encounter   Medication Sig    ergocalciferol (ERGOCALCIFEROL) 50,000 unit Cap Take 50,000 Units by mouth every 7 days.    fluconazole (DIFLUCAN) 150 MG Tab Take one tablet today by mouth and repeat 2nd tablet in 48 hours from the first dose.    folic acid (FOLVITE) 1 MG tablet Take 1 mg by mouth once daily.    senna-docusate 8.6-50 mg (PERICOLACE) 8.6-50 mg per tablet Take 1 tablet by mouth 2 (two) times daily.    valACYclovir (VALTREX) 500 MG tablet Take 500 mg by mouth 2 (two) times daily.     Family History    None       Tobacco Use    Smoking status: Never    Smokeless tobacco: Never   Substance and Sexual Activity    Alcohol use: Not Currently    Drug use: Never    Sexual activity: Not Currently     Review of Systems   Constitutional:  Negative for chills and fever.   Respiratory:  Negative for cough and shortness of breath.    Cardiovascular:  Negative for chest pain.   Gastrointestinal:  Negative for abdominal pain, diarrhea, nausea and vomiting.   Genitourinary:  Negative for dysuria, frequency and hematuria.   Musculoskeletal:         Left leg pain   All other systems reviewed and are negative.    Objective:     Vital Signs (Most Recent):  Temp: 98 °F (36.7 °C) (09/01/24 0243)  Pulse: 70 (09/01/24 0243)  Resp: 17 (09/01/24 0243)  BP: 122/74 (09/01/24 0243)  SpO2: 99 % (09/01/24 0243) Vital Signs (24h Range):  Temp:  [98 °F (36.7 °C)-98.7 °F (37.1 °C)] 98 °F (36.7 °C)  Pulse:  [63-87] 70  Resp:  [15-22] 17  SpO2:  [96 %-100 %] 99 %  BP: (100-122)/(62-74) 122/74     Weight: 51.5 kg (113 lb 8.6 oz)  Body mass index is 20.77 kg/m².     Physical Exam  Vitals reviewed.   Constitutional:       General:  She is not in acute distress.     Appearance: She is not ill-appearing.   HENT:      Nose: Nose normal.   Eyes:      Conjunctiva/sclera: Conjunctivae normal.   Cardiovascular:      Rate and Rhythm: Normal rate and regular rhythm.   Pulmonary:      Effort: Pulmonary effort is normal. No respiratory distress.   Abdominal:      General: There is no distension.      Tenderness: There is no abdominal tenderness.   Musculoskeletal:         General: No swelling or deformity.      Right lower leg: No edema.      Left lower leg: No edema.   Skin:     General: Skin is warm and dry.   Neurological:      Mental Status: She is alert and oriented to person, place, and time.   Psychiatric:         Mood and Affect: Mood normal.         Behavior: Behavior normal.                Significant Labs: All pertinent labs within the past 24 hours have been reviewed.  Recent Lab Results         09/01/24  0005   08/31/24  2322        Albumin   4.3       ALP   71       ALT   13       Anion Gap   12       Appearance, UA Clear         AST   32       Bacteria, UA None         Baso #   0.11       Basophil %   1.0       Bilirubin (UA) Negative         BILIRUBIN TOTAL   3.4  Comment: For infants and newborns, interpretation of results should be based  on gestational age, weight and in agreement with clinical  observations.    Premature Infant recommended reference ranges:  Up to 24 hours.............<8.0 mg/dL  Up to 48 hours............<12.0 mg/dL  3-5 days..................<15.0 mg/dL  6-29 days.................<15.0 mg/dL         BUN   7       Calcium   9.7       Chloride   104       CO2   24       Color, UA Orange         Creatinine   0.7       Differential Method   Automated       eGFR   >60       Eos #   0.2       Eos %   1.5       Glucose   83       Glucose, UA Negative         Gran # (ANC)   5.8       Gran %   52.2       Hematocrit   33.9       Hemoglobin   12.6       Hepatitis C Ab   Negative       HEP C Virus Hold Specimen   Hold for HCV  sendout       Hyaline Casts, UA 0         Immature Grans (Abs)   0.05  Comment: Mild elevation in immature granulocytes is non specific and   can be seen in a variety of conditions including stress response,   acute inflammation, trauma and pregnancy. Correlation with other   laboratory and clinical findings is essential.         Immature Granulocytes   0.5       Ketones, UA Negative         Lactate Dehydrogenase   351  Comment: Results are increased in hemolyzed samples.       Leukocyte Esterase, UA Negative         Lymph #   3.8       Lymph %   34.5       MCH   30.1       MCHC   37.2       MCV   81       Microscopic Comment SEE COMMENT  Comment: Other formed elements not mentioned in the report are not   present in the microscopic examination.            Mono #   1.1       Mono %   10.3       MPV   9.9       NITRITE UA Negative         nRBC   1       Blood, UA 3+         pH, UA 6.0         Platelet Count   275       Potassium   3.7       hCG Qualitative, Urine Negative         PROTEIN TOTAL   8.2       Protein, UA 1+  Comment: Recommend a 24 hour urine protein or a urine   protein/creatinine ratio if globulin induced proteinuria is  clinically suspected.           RBC   4.19       RBC, UA 75         RDW   14.2       Retic   3.6       Sodium   140       Spec Grav UA 1.020         Specimen UA Urine, Clean Catch         Squam Epithel, UA 4         UROBILINOGEN UA Negative         WBC, UA 7         WBC   11.07               Significant Imaging: I have reviewed all pertinent imaging results/findings within the past 24 hours.  X-Ray Hip 2 or 3 views Left with Pelvis when performed   Final Result      No acute findings.         Electronically signed by: Devon Juan   Date:    09/01/2024   Time:    01:33      US Lower Extremity Veins Left   Final Result      No evidence of deep venous thrombosis in the left lower extremity.         Electronically signed by: Devon Juan   Date:    08/31/2024    Time:    23:13         Assessment/Plan:     * Sickle cell pain crisis  Anemia is likely due to chronic disease due to sickle cell disease type SC . Most recent hemoglobin and hematocrit are listed below.  Recent Labs     08/31/24  2322   HGB 12.6   HCT 33.9*     Plan  - Monitor serial CBC: Daily  - Transfuse PRBC if patient becomes hemodynamically unstable, symptomatic or H/H drops below 7/21.  - Patient has not received any PRBC transfusions to date  - Patient's anemia is currently stable  - white blood cell count 11.07, hemoglobin 12.6, reticulocyte count 3.6, , LFTs with total bilirubin 3.4, hCG negative, afebrile  -left lower extremity ultrasound negative for DVT.  Left hip x-ray negative.  -Dilaudid PCA pump with appropriate monitoring, p.r.n. Narcan, O2 supplementation, incentive spirometry, and IV fluids with D5 half-normal saline at 100 mL/hr  -check urinalysis with reflex culture  -continue home regimen of folic acid and Hydrea    Hyperbilirubinemia  Related to sickle cell pain crisis  -LFTs with total bilirubin 3.4 and otherwise unremarkable  -no complaints of abdominal pain and no abdominal tenderness to palpation  -check a.m. labs      History of UTI  Check urinalysis with reflex culture      Marijuana use  Patient was referred by Hematology Oncology to palliative Care for medical marijuana management at most recent visit 07/10/2024      ADD (attention deficit disorder)  Currently on no ADD medications        VTE Risk Mitigation (From admission, onward)           Ordered     IP VTE LOW RISK PATIENT  Once         09/01/24 0332     Place sequential compression device  Until discontinued         09/01/24 0332                       On 09/01/2024, patient should be placed in hospital observation services under my care.             Domitila Dupont MD  Department of Hospital Medicine  [facility] - Emergency Dept.

## 2024-09-01 NOTE — PHARMACY MED REC
"Admission Medication History     The home medication history was taken by Celso Barraza.    You may go to "Admission" then "Reconcile Home Medications" tabs to review and/or act upon these items.     The home medication list has been updated by the Pharmacy department.   Please read ALL comments highlighted in yellow.   Please address this information as you see fit.    Feel free to contact us if you have any questions or require assistance.      The medications listed below were removed from the home medication list. Please reorder if appropriate:  DIFLUCAN 150MG    Medications listed below were obtained from: [employer], Medical records, and Patient's pharmacy  (Not in a hospital admission)      Celso Barraza  [id number]    Current Outpatient Medications on File Prior to Encounter   Medication Sig Dispense Refill Last Dose    ergocalciferol (ERGOCALCIFEROL) 50,000 unit Cap Take 50,000 Units by mouth every 7 days.   8/30/2024    folic acid (FOLVITE) 1 MG tablet Take 1 mg by mouth once daily.   8/30/2024    senna-docusate 8.6-50 mg (PERICOLACE) 8.6-50 mg per tablet Take 1 tablet by mouth 2 (two) times daily.   8/30/2024    valACYclovir (VALTREX) 500 MG tablet Take 500 mg by mouth 2 (two) times daily.      .          "

## 2024-09-01 NOTE — ASSESSMENT & PLAN NOTE
Patient was referred by Hematology Oncology to palliative Care for medical marijuana management at most recent visit 07/10/2024

## 2024-09-02 PROCEDURE — 25000003 PHARM REV CODE 250: Performed by: INTERNAL MEDICINE

## 2024-09-02 PROCEDURE — 99900035 HC TECH TIME PER 15 MIN (STAT)

## 2024-09-02 PROCEDURE — 25000003 PHARM REV CODE 250: Performed by: STUDENT IN AN ORGANIZED HEALTH CARE EDUCATION/TRAINING PROGRAM

## 2024-09-02 PROCEDURE — 11000001 HC ACUTE MED/SURG PRIVATE ROOM

## 2024-09-02 PROCEDURE — 94761 N-INVAS EAR/PLS OXIMETRY MLT: CPT

## 2024-09-02 PROCEDURE — 94799 UNLISTED PULMONARY SVC/PX: CPT

## 2024-09-02 RX ORDER — OXYCODONE AND ACETAMINOPHEN 7.5; 325 MG/1; MG/1
1 TABLET ORAL EVERY 6 HOURS PRN
Status: DISCONTINUED | OUTPATIENT
Start: 2024-09-02 | End: 2024-09-03 | Stop reason: HOSPADM

## 2024-09-02 RX ORDER — OXYCODONE HYDROCHLORIDE 5 MG/1
5 TABLET ORAL EVERY 4 HOURS PRN
Status: DISCONTINUED | OUTPATIENT
Start: 2024-09-02 | End: 2024-09-03 | Stop reason: HOSPADM

## 2024-09-02 RX ADMIN — FOLIC ACID 1 MG: 1 TABLET ORAL at 09:09

## 2024-09-02 RX ADMIN — ACETAMINOPHEN 650 MG: 325 TABLET ORAL at 07:09

## 2024-09-02 RX ADMIN — HYDROXYUREA 1500 MG: 500 CAPSULE ORAL at 09:09

## 2024-09-02 RX ADMIN — OXYCODONE AND ACETAMINOPHEN 1 TABLET: 7.5; 325 TABLET ORAL at 06:09

## 2024-09-02 NOTE — PLAN OF CARE
Discussed poc with pt, pt verbalized understanding    Purposeful rounding every 2hours    VS wnl  Cardiac monitoring in use, pt is NSR, tele monitor # [id number]  Fall precautions in place, remains injury free  Pt denies c/o pain   Pain and nausea under control with PRN meds    IVFs  Accurate I&Os  Bed locked at lowest position  Call light within reach    Chart check complete  Will cont with POC

## 2024-09-02 NOTE — SUBJECTIVE & OBJECTIVE
Interval History:     Alert and oriented x3   Stated slight improvement in pain   Agreeable for transitioning from PCA pump to home regimen along with medications for breakthrough pain   Follow up on ultrasound right upper quadrant  If clinically improves, anticipate discharge in next 24-48 hours    Review of Systems  Objective:     Vital Signs (Most Recent):  Temp: 97.9 °F (36.6 °C) (09/02/24 0713)  Pulse: 84 (09/02/24 0803)  Resp: 15 (09/02/24 0713)  BP: 109/74 (09/02/24 0713)  SpO2: 99 % (09/02/24 1033) Vital Signs (24h Range):  Temp:  [97.9 °F (36.6 °C)-98.4 °F (36.9 °C)] 97.9 °F (36.6 °C)  Pulse:  [59-86] 84  Resp:  [12-18] 15  SpO2:  [98 %-100 %] 99 %  BP: (100-119)/(60-74) 109/74     Weight: 51.5 kg (113 lb 8.6 oz)  Body mass index is 20.77 kg/m².    Intake/Output Summary (Last 24 hours) at 9/2/2024 1104  Last data filed at 9/2/2024 1101  Gross per 24 hour   Intake 14.5 ml   Output --   Net 14.5 ml         Physical Exam      Constitutional:       General: She is not in acute distress.     Appearance: She is not ill-appearing.   HENT:      Mouth/Throat:      Mouth: Mucous membranes are moist.      Pharynx: Oropharynx is clear.   Eyes:      Pupils: Pupils are equal, round, and reactive to light.   Cardiovascular:      Rate and Rhythm: Normal rate and regular rhythm.      Heart sounds: No murmur heard.  Pulmonary:      Effort: Pulmonary effort is normal.      Breath sounds: Normal breath sounds. No wheezing.   Abdominal:      General: Bowel sounds are normal. There is no distension.      Palpations: Abdomen is soft.      Tenderness: There is no abdominal tenderness.   Musculoskeletal:         General: Normal range of motion.   Skin:     General: Skin is warm and dry.   Neurological:      General: No focal deficit present.      Mental Status: She is alert and oriented to person, place, and time.   Significant Labs: All pertinent labs within the past 24 hours have been reviewed.  CBC:   Recent Labs   Lab  08/31/24 2322 09/01/24 0346   WBC 11.07 14.40*   HGB 12.6 11.4*   HCT 33.9* 31.2*    247     CMP:   Recent Labs   Lab 08/31/24 2322 09/01/24 0346    138   K 3.7 4.4    106   CO2 24 21*   GLU 83 114*   BUN 7 7   CREATININE 0.7 0.7   CALCIUM 9.7 9.2   PROT 8.2 8.1   ALBUMIN 4.3 4.1   BILITOT 3.4* 4.4*   ALKPHOS 71 67   AST 32 39   ALT 13 16   ANIONGAP 12 11       Significant Imaging:     Imaging Results              X-Ray Hip 2 or 3 views Left with Pelvis when performed (Final result)  Result time 09/01/24 01:33:59      Final result by Devon Juan MD (09/01/24 01:33:59)                   Impression:      No acute findings.      Electronically signed by: Devon Juan  Date:    09/01/2024  Time:    01:33               Narrative:    EXAMINATION:  XR HIP WITH PELVIS WHEN PERFORMED 2 OR 3 VIEWS LEFT    CLINICAL HISTORY:  left leg pain;    TECHNIQUE:  AP view of the pelvis and frog leg lateral view of the left hip were performed.    COMPARISON:  None    FINDINGS:  No radiographic evidence of osteonecrosis.  No acute fracture, dislocation, or traumatic malalignment.  Joint spaces are maintained.                                       US Lower Extremity Veins Left (Final result)  Result time 08/31/24 23:13:48      Final result by Devon Juan MD (08/31/24 23:13:48)                   Impression:      No evidence of deep venous thrombosis in the left lower extremity.      Electronically signed by: Devon Juan  Date:    08/31/2024  Time:    23:13               Narrative:    EXAMINATION:  US LOWER EXTREMITY VEINS LEFT    CLINICAL HISTORY:  Pain in left leg    TECHNIQUE:  Duplex and color flow Doppler evaluation and graded compression of the left lower extremity veins was performed.    COMPARISON:  None    FINDINGS:  Left thigh veins: The common femoral, femoral, popliteal, upper greater saphenous, and deep femoral veins are patent and free of thrombus. The veins are normally  compressible and have normal phasic flow and augmentation response.    Left calf veins: The visualized calf veins are patent.    Contralateral CFV: The contralateral (right) common femoral vein is patent and free of thrombus.    Miscellaneous: None

## 2024-09-02 NOTE — PLAN OF CARE
[id number]  Pain pump continues to be in use  VSS    Ambulated and took a shower, no harm or injuries  Cardiac monitoring in use, pt is NSR, tele monitor # [id number]   Purposeful rounding Q2H    IVFs  Bed locked at lowest position  Call light within reach     Chart check complete  Will cont with POC

## 2024-09-02 NOTE — PROGRESS NOTES
[facility]  Progress Note    Patient Name: Francy Zuleta  MRN: [id number]  Patient Class: IP- Inpatient   Admission Date: 8/31/2024  Length of Stay: 1 days  Attending Physician: Sandeep Hillman,*  Primary Care Provider: Maddie, Primary Doctor        Subjective:     Principal Problem:Sickle cell pain crisis        HPI:  29-year-old  woman with history of sickle cell disease type SC, ADD, THC use (prescribed), HSV infection, bacterial vaginosis, UTI, and COVID-19 infection who presented to the emergency department with complaint of left lower extremity pain that began yesterday morning, constant, 10/10 on the pain scale, and described as throbbing.  This is typical of her usual sickle cell crisis.  She was unable to take any pain medications as she was at work all day.  No pain elsewhere.  She denies any associated nausea, vomiting, fevers, chills, diarrhea, constipation, dysuria, hematuria, chest pain, shortness for breath, or cough.  Patient received a total of 4 mg of IV Dilaudid, Benadryl 25 mg IV, and Toradol 15 mg IV while in the emergency department with no relief in her symptoms.    Patient was last seen in [facility] Oncology Clinic 07/10/2024 and initiated on Hydrea due to more frequent sickle cell pain events.  She was also referred to Palliative Care for medical marijuana management.  Patient is followed by Hematology Oncology Dr. Perdomo.    Last hospital admit 5/12-05/15/2024 for sickle cell pain crisis with complaint of leg pain.    Overview/Hospital Course:  28 y/o female admitted for sickle cell pain crisis   H/H 11.4/31.2, Retic count 3.6, Bili total 4.4,    X-ray hip with pelvis: No acute findings.  US lower extremity: No evidence of deep venous thrombosis in the left lower extremity.   PCA and IVF for pain management     Interval History:     Alert and oriented x3   Stated slight improvement in pain   Agreeable for transitioning from PCA pump to  home regimen along with medications for breakthrough pain   Follow up on ultrasound right upper quadrant  If clinically improves, anticipate discharge in next 24-48 hours    Review of Systems  Objective:     Vital Signs (Most Recent):  Temp: 97.9 °F (36.6 °C) (09/02/24 0713)  Pulse: 84 (09/02/24 0803)  Resp: 15 (09/02/24 0713)  BP: 109/74 (09/02/24 0713)  SpO2: 99 % (09/02/24 1033) Vital Signs (24h Range):  Temp:  [97.9 °F (36.6 °C)-98.4 °F (36.9 °C)] 97.9 °F (36.6 °C)  Pulse:  [59-86] 84  Resp:  [12-18] 15  SpO2:  [98 %-100 %] 99 %  BP: (100-119)/(60-74) 109/74     Weight: 51.5 kg (113 lb 8.6 oz)  Body mass index is 20.77 kg/m².    Intake/Output Summary (Last 24 hours) at 9/2/2024 1104  Last data filed at 9/2/2024 1101  Gross per 24 hour   Intake 14.5 ml   Output --   Net 14.5 ml         Physical Exam      Constitutional:       General: She is not in acute distress.     Appearance: She is not ill-appearing.   HENT:      Mouth/Throat:      Mouth: Mucous membranes are moist.      Pharynx: Oropharynx is clear.   Eyes:      Pupils: Pupils are equal, round, and reactive to light.   Cardiovascular:      Rate and Rhythm: Normal rate and regular rhythm.      Heart sounds: No murmur heard.  Pulmonary:      Effort: Pulmonary effort is normal.      Breath sounds: Normal breath sounds. No wheezing.   Abdominal:      General: Bowel sounds are normal. There is no distension.      Palpations: Abdomen is soft.      Tenderness: There is no abdominal tenderness.   Musculoskeletal:         General: Normal range of motion.   Skin:     General: Skin is warm and dry.   Neurological:      General: No focal deficit present.      Mental Status: She is alert and oriented to person, place, and time.   Significant Labs: All pertinent labs within the past 24 hours have been reviewed.  CBC:   Recent Labs   Lab 08/31/24  2322 09/01/24  0346   WBC 11.07 14.40*   HGB 12.6 11.4*   HCT 33.9* 31.2*    247     CMP:   Recent Labs   Lab  08/31/24 2322 09/01/24  0346    138   K 3.7 4.4    106   CO2 24 21*   GLU 83 114*   BUN 7 7   CREATININE 0.7 0.7   CALCIUM 9.7 9.2   PROT 8.2 8.1   ALBUMIN 4.3 4.1   BILITOT 3.4* 4.4*   ALKPHOS 71 67   AST 32 39   ALT 13 16   ANIONGAP 12 11       Significant Imaging:     Imaging Results              X-Ray Hip 2 or 3 views Left with Pelvis when performed (Final result)  Result time 09/01/24 01:33:59      Final result by Devon Juan MD (09/01/24 01:33:59)                   Impression:      No acute findings.      Electronically signed by: Devon Juan  Date:    09/01/2024  Time:    01:33               Narrative:    EXAMINATION:  XR HIP WITH PELVIS WHEN PERFORMED 2 OR 3 VIEWS LEFT    CLINICAL HISTORY:  left leg pain;    TECHNIQUE:  AP view of the pelvis and frog leg lateral view of the left hip were performed.    COMPARISON:  None    FINDINGS:  No radiographic evidence of osteonecrosis.  No acute fracture, dislocation, or traumatic malalignment.  Joint spaces are maintained.                                       US Lower Extremity Veins Left (Final result)  Result time 08/31/24 23:13:48      Final result by Devon Juan MD (08/31/24 23:13:48)                   Impression:      No evidence of deep venous thrombosis in the left lower extremity.      Electronically signed by: Devon Juan  Date:    08/31/2024  Time:    23:13               Narrative:    EXAMINATION:  US LOWER EXTREMITY VEINS LEFT    CLINICAL HISTORY:  Pain in left leg    TECHNIQUE:  Duplex and color flow Doppler evaluation and graded compression of the left lower extremity veins was performed.    COMPARISON:  None    FINDINGS:  Left thigh veins: The common femoral, femoral, popliteal, upper greater saphenous, and deep femoral veins are patent and free of thrombus. The veins are normally compressible and have normal phasic flow and augmentation response.    Left calf veins: The visualized calf veins are  patent.    Contralateral CFV: The contralateral (right) common femoral vein is patent and free of thrombus.    Miscellaneous: None                                       Assessment/Plan:      * Sickle cell pain crisis  Anemia is likely due to chronic disease due to sickle cell disease type SC . Most recent hemoglobin and hematocrit are listed below.  Recent Labs     08/31/24  2322 09/01/24  0346   HGB 12.6 11.4*   HCT 33.9* 31.2*       Plan  - Monitor serial CBC: Daily  - Transfuse PRBC if patient becomes hemodynamically unstable, symptomatic or H/H drops below 7/21.  - Patient has not received any PRBC transfusions to date  - Patient's anemia is currently stable  - white blood cell count 11.07, hemoglobin 12.6, reticulocyte count 3.6, , LFTs with total bilirubin 3.4, hCG negative, afebrile  -left lower extremity ultrasound negative for DVT.  Left hip x-ray negative.  -Dilaudid PCA pump with appropriate monitoring, p.r.n. Narcan, O2 supplementation, incentive spirometry, and IV fluids with D5 half-normal saline at 100 mL/hr  -check urinalysis with reflex culture, +7 WBCs 3+ blood and +1 protein   -continue home regimen of folic acid and Hydrea    History of UTI  -check urinalysis with reflex culture, +7 WBCs 3+ blood and +1 protein       Hyperbilirubinemia  Related to sickle cell pain crisis  -LFTs with total bilirubin 3.4 and otherwise unremarkable  -no complaints of abdominal pain and no abdominal tenderness to palpation  -check a.m. labs      Marijuana use  Patient was referred by Hematology Oncology to palliative Care for medical marijuana management at most recent visit 07/10/2024      ADD (attention deficit disorder)  Currently on no ADD medications        VTE Risk Mitigation (From admission, onward)           Ordered     IP VTE LOW RISK PATIENT  Once         09/01/24 0332     Place sequential compression device  Until discontinued         09/01/24 0332                    Discharge Planning   XUAN:       Code Status: Full Code   Is the patient medically ready for discharge?:     Reason for patient still in hospital (select all that apply): Patient trending condition, Imaging, and Pending disposition  Discharge Plan A: Home                  Sandeep Hillman MD  Department of Hospital Medicine   [facility] - Med Surg

## 2024-09-02 NOTE — PLAN OF CARE
[facility] - Med Surg  Initial Discharge Assessment       Primary Care Provider: No, Primary Doctor    Admission Diagnosis: Sickle cell anemia with pain [D57.00]  Left leg pain [M79.605]  Sickle cell pain crisis [D57.00]  Chest pain [R07.9]    Admission Date: 8/31/2024  Expected Discharge Date:     Transition of Care Barriers: None    Payor: MEDICAID / [facility] / Product Type: Managed Medicaid /     No emergency contact information on file.    Discharge Plan A: Home       No Pharmacies Listed    Initial Assessment (most recent)       Adult Discharge Assessment - 09/02/24 1017          Discharge Assessment    Assessment Type Discharge Planning Assessment     Confirmed/corrected address, phone number and insurance Yes     Confirmed Demographics Correct on Facesheet     Source of Information patient     Does patient/caregiver understand observation status Yes     Communicated XUAN with patient/caregiver Date not available/Unable to determine     People in Home alone     Do you expect to return to your current living situation? Yes     Do you have help at home or someone to help you manage your care at home? No     Prior to hospitilization cognitive status: Alert/Oriented     Current cognitive status: Alert/Oriented     Walking or Climbing Stairs Difficulty no     Dressing/Bathing Difficulty no     Equipment Currently Used at Home none     Readmission within 30 days? No     Patient currently being followed by outpatient case management? No     Do you currently have service(s) that help you manage your care at home? No     Do you take prescription medications? Yes     Do you have prescription coverage? Yes     Do you have any problems affording any of your prescribed medications? No     Is the patient taking medications as prescribed? yes     Who is going to help you get home at discharge? family     How do you get to doctors appointments? car, drives self     Are you on dialysis? No     Do you take coumadin?  No     Discharge Plan A Home     DME Needed Upon Discharge  none     Discharge Plan discussed with: Patient     Transition of Care Barriers None

## 2024-09-03 VITALS
DIASTOLIC BLOOD PRESSURE: 64 MMHG | HEART RATE: 73 BPM | HEIGHT: 62 IN | OXYGEN SATURATION: 98 % | SYSTOLIC BLOOD PRESSURE: 101 MMHG | BODY MASS INDEX: 20.9 KG/M2 | RESPIRATION RATE: 20 BRPM | TEMPERATURE: 98 F | WEIGHT: 113.56 LBS

## 2024-09-03 LAB
ALBUMIN SERPL BCP-MCNC: 3.4 G/DL (ref 3.5–5.2)
ALP SERPL-CCNC: 66 U/L (ref 55–135)
ALT SERPL W/O P-5'-P-CCNC: 9 U/L (ref 10–44)
ANION GAP SERPL CALC-SCNC: 8 MMOL/L (ref 8–16)
AST SERPL-CCNC: 19 U/L (ref 10–40)
BASOPHILS # BLD AUTO: 0.07 K/UL (ref 0–0.2)
BASOPHILS NFR BLD: 0.7 % (ref 0–1.9)
BILIRUB SERPL-MCNC: 2.9 MG/DL (ref 0.1–1)
BUN SERPL-MCNC: 5 MG/DL (ref 6–20)
CALCIUM SERPL-MCNC: 8.9 MG/DL (ref 8.7–10.5)
CHLORIDE SERPL-SCNC: 105 MMOL/L (ref 95–110)
CO2 SERPL-SCNC: 26 MMOL/L (ref 23–29)
CREAT SERPL-MCNC: 0.6 MG/DL (ref 0.5–1.4)
DIFFERENTIAL METHOD BLD: ABNORMAL
EOSINOPHIL # BLD AUTO: 0.3 K/UL (ref 0–0.5)
EOSINOPHIL NFR BLD: 2.5 % (ref 0–8)
ERYTHROCYTE [DISTWIDTH] IN BLOOD BY AUTOMATED COUNT: 14.1 % (ref 11.5–14.5)
EST. GFR  (NO RACE VARIABLE): >60 ML/MIN/1.73 M^2
GLUCOSE SERPL-MCNC: 92 MG/DL (ref 70–110)
HCT VFR BLD AUTO: 30.4 % (ref 37–48.5)
HGB BLD-MCNC: 11.3 G/DL (ref 12–16)
IMM GRANULOCYTES # BLD AUTO: 0.03 K/UL (ref 0–0.04)
IMM GRANULOCYTES NFR BLD AUTO: 0.3 % (ref 0–0.5)
LYMPHOCYTES # BLD AUTO: 2.5 K/UL (ref 1–4.8)
LYMPHOCYTES NFR BLD: 25.2 % (ref 18–48)
MCH RBC QN AUTO: 30.1 PG (ref 27–31)
MCHC RBC AUTO-ENTMCNC: 37.2 G/DL (ref 32–36)
MCV RBC AUTO: 81 FL (ref 82–98)
MONOCYTES # BLD AUTO: 0.8 K/UL (ref 0.3–1)
MONOCYTES NFR BLD: 8.5 % (ref 4–15)
NEUTROPHILS # BLD AUTO: 6.2 K/UL (ref 1.8–7.7)
NEUTROPHILS NFR BLD: 62.8 % (ref 38–73)
NRBC BLD-RTO: 0 /100 WBC
PLATELET # BLD AUTO: 242 K/UL (ref 150–450)
PMV BLD AUTO: 9.9 FL (ref 9.2–12.9)
POTASSIUM SERPL-SCNC: 3.5 MMOL/L (ref 3.5–5.1)
PROT SERPL-MCNC: 6.9 G/DL (ref 6–8.4)
RBC # BLD AUTO: 3.75 M/UL (ref 4–5.4)
RETICS/RBC NFR AUTO: 3.2 % (ref 0.5–2.5)
SODIUM SERPL-SCNC: 139 MMOL/L (ref 136–145)
WBC # BLD AUTO: 9.88 K/UL (ref 3.9–12.7)

## 2024-09-03 PROCEDURE — 94799 UNLISTED PULMONARY SVC/PX: CPT

## 2024-09-03 PROCEDURE — 80053 COMPREHEN METABOLIC PANEL: CPT | Performed by: STUDENT IN AN ORGANIZED HEALTH CARE EDUCATION/TRAINING PROGRAM

## 2024-09-03 PROCEDURE — 25000003 PHARM REV CODE 250: Performed by: INTERNAL MEDICINE

## 2024-09-03 PROCEDURE — 25000003 PHARM REV CODE 250: Performed by: STUDENT IN AN ORGANIZED HEALTH CARE EDUCATION/TRAINING PROGRAM

## 2024-09-03 PROCEDURE — 94761 N-INVAS EAR/PLS OXIMETRY MLT: CPT

## 2024-09-03 PROCEDURE — 85045 AUTOMATED RETICULOCYTE COUNT: CPT | Performed by: STUDENT IN AN ORGANIZED HEALTH CARE EDUCATION/TRAINING PROGRAM

## 2024-09-03 PROCEDURE — S5010 5% DEXTROSE AND 0.45% SALINE: HCPCS | Performed by: INTERNAL MEDICINE

## 2024-09-03 PROCEDURE — 99900035 HC TECH TIME PER 15 MIN (STAT)

## 2024-09-03 PROCEDURE — 85025 COMPLETE CBC W/AUTO DIFF WBC: CPT | Performed by: STUDENT IN AN ORGANIZED HEALTH CARE EDUCATION/TRAINING PROGRAM

## 2024-09-03 PROCEDURE — 36415 COLL VENOUS BLD VENIPUNCTURE: CPT | Performed by: STUDENT IN AN ORGANIZED HEALTH CARE EDUCATION/TRAINING PROGRAM

## 2024-09-03 RX ORDER — OXYCODONE AND ACETAMINOPHEN 7.5; 325 MG/1; MG/1
1 TABLET ORAL EVERY 6 HOURS PRN
Qty: 14 TABLET | Refills: 0 | Status: SHIPPED | OUTPATIENT
Start: 2024-09-03 | End: 2024-09-08

## 2024-09-03 RX ADMIN — DEXTROSE AND SODIUM CHLORIDE: 5; 450 INJECTION, SOLUTION INTRAVENOUS at 07:09

## 2024-09-03 RX ADMIN — HYDROXYUREA 1500 MG: 500 CAPSULE ORAL at 09:09

## 2024-09-03 RX ADMIN — OXYCODONE AND ACETAMINOPHEN 1 TABLET: 7.5; 325 TABLET ORAL at 05:09

## 2024-09-03 RX ADMIN — FOLIC ACID 1 MG: 1 TABLET ORAL at 09:09

## 2024-09-03 NOTE — PLAN OF CARE
[facility] - Med Surg  Discharge Final Note    Primary Care Provider: No, Primary Doctor    Expected Discharge Date: 9/3/2024    Final Discharge Note (most recent)       Final Note - 09/03/24 0847          Final Note    Assessment Type Final Discharge Note     Anticipated Discharge Disposition Home or Self Care        Post-Acute Status    Discharge Delays None known at this time                   Contact Info       Rachael Perdomo MD    [address] [facility] [address]   Phone: [phone]       Next Steps: Follow up on 9/3/2024    [facility] - Emergency Dept.   Specialty: Emergency Medicine    [id number] [place] [id number]   Phone: [phone]       Next Steps: Follow up    Instructions: As needed, If symptoms worsen        Patient has no d/c needs at this time. Sw to follow up, as needed, for d/c planning purposes.

## 2024-09-03 NOTE — DISCHARGE SUMMARY
[facility] Medicine  Discharge Summary      Patient Name: Francy Zuleta  MRN: [id number]  ERIKA: [id number]  Patient Class: IP- Inpatient  Admission Date: 8/31/2024  Hospital Length of Stay: 2 days  Discharge Date and Time: 9/3/24  Attending Physician: Sandeep Hillman,*   Discharging Provider: Sandeep Hillman MD  Primary Care Provider: Maddie, Primary Doctor    Primary Care Team: Networked reference to record PCT     HPI:   29-year-old  woman with history of sickle cell disease type SC, ADD, THC use (prescribed), HSV infection, bacterial vaginosis, UTI, and COVID-19 infection who presented to the emergency department with complaint of left lower extremity pain that began yesterday morning, constant, 10/10 on the pain scale, and described as throbbing.  This is typical of her usual sickle cell crisis.  She was unable to take any pain medications as she was at work all day.  No pain elsewhere.  She denies any associated nausea, vomiting, fevers, chills, diarrhea, constipation, dysuria, hematuria, chest pain, shortness for breath, or cough.  Patient received a total of 4 mg of IV Dilaudid, Benadryl 25 mg IV, and Toradol 15 mg IV while in the emergency department with no relief in her symptoms.    Patient was last seen in Hematology Oncology Clinic 07/10/2024 and initiated on Hydrea due to more frequent sickle cell pain events.  She was also referred to Palliative Care for medical marijuana management.  Patient is followed by Hematology Oncology Dr. Perdomo.    Last hospital admit 5/12-05/15/2024 for sickle cell pain crisis with complaint of leg pain.    * No surgery found *      Hospital Course:   30 y/o female admitted for sickle cell pain crisis   H/H 11.4/31.2, Retic count 3.6, Bili total 4.4,    X-ray hip with pelvis: No acute findings.  US lower extremity: No evidence of deep venous thrombosis in the left lower extremity.   PCA and IVF for pain management      09/03/2024   Examination of patient done at bedside, appeared alert and oriented x3, denied acute events overnight.    Hemodynamically stable   Denied fever, chills, chest pain, shortness O breath, nausea, vomiting, bowel or bladder issues   Stated significant improvement in pain, currently at baseline   Afebrile, no leukocytosis   Hemoglobin stable   Denied any overt signs of bleeding   LFTs/T bili trended down, ultrasound right upper quadrant did not show acute findings  Considering clinical and hemodynamic stability, planning to discharge patient today, emphasized on compliance with medications, outpatient follow up with PCP, Hematology within 1 week upon discharge, patient expressed understanding, agreed with the plan   Medications to be delivered to bedside prior to discharge        Goals of Care Treatment Preferences:  Code Status: Full Code      SDOH Screening:  The patient was screened for utility difficulties, food insecurity, transport difficulties, housing insecurity, and interpersonal safety and there were no concerns identified this admission.     Consults:     No new Assessment & Plan notes have been filed under this hospital service since the last note was generated.  Service: Hospital Medicine    Final Active Diagnoses:    Diagnosis Date Noted POA    PRINCIPAL PROBLEM:  Sickle cell pain crisis [D57.00] 05/12/2024 Yes    Hyperbilirubinemia [E80.6] 09/01/2024 Yes    History of UTI [Z87.440] 09/01/2024 Yes    ADD (attention deficit disorder) [F98.8] 05/12/2024 Yes    Marijuana use [F12.90] 05/12/2024 Yes      Problems Resolved During this Admission:       Discharged Condition: fair    Disposition: Home or Self Care    Follow Up:   Follow-up Information       Rachael Perdomo MD On 9/3/2024.    Contact information:  [address] [facility] [address] [phone]               [facility] - Emergency Dept. .    Specialty: Emergency Medicine  Why: As needed, If symptoms worsen  Contact  information:  [address] [facility] [address] [phone]             No, Primary Doctor Follow up in 1 week(s).                           Patient Instructions:   No discharge procedures on file.    Significant Diagnostic Studies:     Results for orders placed or performed during the hospital encounter of 08/31/24   Blood culture    Specimen: Peripheral, Hand, Left; Blood   Result Value Ref Range    Blood Culture, Routine No Growth to date     Blood Culture, Routine No Growth to date    Blood culture    Specimen: Blood   Result Value Ref Range    Blood Culture, Routine No Growth to date     Blood Culture, Routine No Growth to date    Hepatitis C Antibody   Result Value Ref Range    Hepatitis C Ab Negative Negative   HCV Virus Hold Specimen   Result Value Ref Range    HEP C Virus Hold Specimen Hold for HCV sendout    CBC auto differential   Result Value Ref Range    WBC 11.07 3.90 - 12.70 K/uL    RBC 4.19 4.00 - 5.40 M/uL    Hemoglobin 12.6 12.0 - 16.0 g/dL    Hematocrit 33.9 (L) 37.0 - 48.5 %    MCV 81 (L) 82 - 98 fL    MCH 30.1 27.0 - 31.0 pg    MCHC 37.2 (H) 32.0 - 36.0 g/dL    RDW 14.2 11.5 - 14.5 %    Platelets 275 150 - 450 K/uL    MPV 9.9 9.2 - 12.9 fL    Immature Granulocytes 0.5 0.0 - 0.5 %    Gran # (ANC) 5.8 1.8 - 7.7 K/uL    Immature Grans (Abs) 0.05 (H) 0.00 - 0.04 K/uL    Lymph # 3.8 1.0 - 4.8 K/uL    Mono # 1.1 (H) 0.3 - 1.0 K/uL    Eos # 0.2 0.0 - 0.5 K/uL    Baso # 0.11 0.00 - 0.20 K/uL    nRBC 1 (A) 0 /100 WBC    Gran % 52.2 38.0 - 73.0 %    Lymph % 34.5 18.0 - 48.0 %    Mono % 10.3 4.0 - 15.0 %    Eosinophil % 1.5 0.0 - 8.0 %    Basophil % 1.0 0.0 - 1.9 %    Differential Method Automated    Comprehensive metabolic panel   Result Value Ref Range    Sodium 140 136 - 145 mmol/L    Potassium 3.7 3.5 - 5.1 mmol/L    Chloride 104 95 - 110 mmol/L    CO2 24 23 - 29 mmol/L    Glucose 83 70 - 110 mg/dL    BUN 7 6 - 20 mg/dL    Creatinine 0.7 0.5 - 1.4 mg/dL    Calcium 9.7 8.7 -  10.5 mg/dL    Total Protein 8.2 6.0 - 8.4 g/dL    Albumin 4.3 3.5 - 5.2 g/dL    Total Bilirubin 3.4 (H) 0.1 - 1.0 mg/dL    Alkaline Phosphatase 71 55 - 135 U/L    AST 32 10 - 40 U/L    ALT 13 10 - 44 U/L    eGFR >60 >60 mL/min/1.73 m^2    Anion Gap 12 8 - 16 mmol/L   Reticulocytes   Result Value Ref Range    Retic 3.6 (H) 0.5 - 2.5 %   Lactate Dehydrogenase   Result Value Ref Range     (H) 110 - 260 U/L   Pregnancy, urine rapid (UPT)   Result Value Ref Range    Preg Test, Ur Negative    CBC Auto Differential   Result Value Ref Range    WBC 14.40 (H) 3.90 - 12.70 K/uL    RBC 3.85 (L) 4.00 - 5.40 M/uL    Hemoglobin 11.4 (L) 12.0 - 16.0 g/dL    Hematocrit 31.2 (L) 37.0 - 48.5 %    MCV 81 (L) 82 - 98 fL    MCH 29.6 27.0 - 31.0 pg    MCHC 36.5 (H) 32.0 - 36.0 g/dL    RDW 14.5 11.5 - 14.5 %    Platelets 247 150 - 450 K/uL    MPV 9.7 9.2 - 12.9 fL    Immature Granulocytes 0.4 0.0 - 0.5 %    Gran # (ANC) 8.9 (H) 1.8 - 7.7 K/uL    Immature Grans (Abs) 0.06 (H) 0.00 - 0.04 K/uL    Lymph # 3.6 1.0 - 4.8 K/uL    Mono # 1.6 (H) 0.3 - 1.0 K/uL    Eos # 0.1 0.0 - 0.5 K/uL    Baso # 0.10 0.00 - 0.20 K/uL    nRBC 0 0 /100 WBC    Gran % 61.9 38.0 - 73.0 %    Lymph % 25.3 18.0 - 48.0 %    Mono % 11.4 4.0 - 15.0 %    Eosinophil % 0.3 0.0 - 8.0 %    Basophil % 0.7 0.0 - 1.9 %    Differential Method Automated    Comprehensive metabolic panel   Result Value Ref Range    Sodium 138 136 - 145 mmol/L    Potassium 4.4 3.5 - 5.1 mmol/L    Chloride 106 95 - 110 mmol/L    CO2 21 (L) 23 - 29 mmol/L    Glucose 114 (H) 70 - 110 mg/dL    BUN 7 6 - 20 mg/dL    Creatinine 0.7 0.5 - 1.4 mg/dL    Calcium 9.2 8.7 - 10.5 mg/dL    Total Protein 8.1 6.0 - 8.4 g/dL    Albumin 4.1 3.5 - 5.2 g/dL    Total Bilirubin 4.4 (H) 0.1 - 1.0 mg/dL    Alkaline Phosphatase 67 55 - 135 U/L    AST 39 10 - 40 U/L    ALT 16 10 - 44 U/L    eGFR >60 >60 mL/min/1.73 m^2    Anion Gap 11 8 - 16 mmol/L   Urinalysis, Reflex to Urine Culture   Result Value Ref Range     Specimen UA Urine, Clean Catch     Color, UA Orange (A) Yellow, Straw, Adelina    Appearance, UA Clear Clear    pH, UA 6.0 5.0 - 8.0    Specific Gravity, UA 1.020 1.005 - 1.030    Protein, UA 1+ (A) Negative    Glucose, UA Negative Negative    Ketones, UA Negative Negative    Bilirubin (UA) Negative Negative    Occult Blood UA 3+ (A) Negative    Nitrite, UA Negative Negative    Urobilinogen, UA Negative <2.0 EU/dL    Leukocytes, UA Negative Negative   Urinalysis Microscopic   Result Value Ref Range    RBC, UA 75 (H) 0 - 4 /hpf    WBC, UA 7 (H) 0 - 5 /hpf    Bacteria None None-Occ /hpf    Squam Epithel, UA 4 /hpf    Hyaline Casts, UA 0 0-1/lpf /lpf    Microscopic Comment SEE COMMENT    CBC Auto Differential   Result Value Ref Range    WBC 9.88 3.90 - 12.70 K/uL    RBC 3.75 (L) 4.00 - 5.40 M/uL    Hemoglobin 11.3 (L) 12.0 - 16.0 g/dL    Hematocrit 30.4 (L) 37.0 - 48.5 %    MCV 81 (L) 82 - 98 fL    MCH 30.1 27.0 - 31.0 pg    MCHC 37.2 (H) 32.0 - 36.0 g/dL    RDW 14.1 11.5 - 14.5 %    Platelets 242 150 - 450 K/uL    MPV 9.9 9.2 - 12.9 fL    Immature Granulocytes 0.3 0.0 - 0.5 %    Gran # (ANC) 6.2 1.8 - 7.7 K/uL    Immature Grans (Abs) 0.03 0.00 - 0.04 K/uL    Lymph # 2.5 1.0 - 4.8 K/uL    Mono # 0.8 0.3 - 1.0 K/uL    Eos # 0.3 0.0 - 0.5 K/uL    Baso # 0.07 0.00 - 0.20 K/uL    nRBC 0 0 /100 WBC    Gran % 62.8 38.0 - 73.0 %    Lymph % 25.2 18.0 - 48.0 %    Mono % 8.5 4.0 - 15.0 %    Eosinophil % 2.5 0.0 - 8.0 %    Basophil % 0.7 0.0 - 1.9 %    Differential Method Automated    Comprehensive Metabolic Panel   Result Value Ref Range    Sodium 139 136 - 145 mmol/L    Potassium 3.5 3.5 - 5.1 mmol/L    Chloride 105 95 - 110 mmol/L    CO2 26 23 - 29 mmol/L    Glucose 92 70 - 110 mg/dL    BUN 5 (L) 6 - 20 mg/dL    Creatinine 0.6 0.5 - 1.4 mg/dL    Calcium 8.9 8.7 - 10.5 mg/dL    Total Protein 6.9 6.0 - 8.4 g/dL    Albumin 3.4 (L) 3.5 - 5.2 g/dL    Total Bilirubin 2.9 (H) 0.1 - 1.0 mg/dL    Alkaline Phosphatase 66 55 - 135 U/L     AST 19 10 - 40 U/L    ALT 9 (L) 10 - 44 U/L    eGFR >60 >60 mL/min/1.73 m^2    Anion Gap 8 8 - 16 mmol/L   Reticulocytes   Result Value Ref Range    Retic 3.2 (H) 0.5 - 2.5 %        Imaging Results              X-Ray Hip 2 or 3 views Left with Pelvis when performed (Final result)  Result time 09/01/24 01:33:59      Final result by Devon Juan MD (09/01/24 01:33:59)                   Impression:      No acute findings.      Electronically signed by: Devon Juan  Date:    09/01/2024  Time:    01:33               Narrative:    EXAMINATION:  XR HIP WITH PELVIS WHEN PERFORMED 2 OR 3 VIEWS LEFT    CLINICAL HISTORY:  left leg pain;    TECHNIQUE:  AP view of the pelvis and frog leg lateral view of the left hip were performed.    COMPARISON:  None    FINDINGS:  No radiographic evidence of osteonecrosis.  No acute fracture, dislocation, or traumatic malalignment.  Joint spaces are maintained.                                       US Lower Extremity Veins Left (Final result)  Result time 08/31/24 23:13:48      Final result by Devon Juan MD (08/31/24 23:13:48)                   Impression:      No evidence of deep venous thrombosis in the left lower extremity.      Electronically signed by: Devon Juan  Date:    08/31/2024  Time:    23:13               Narrative:    EXAMINATION:  US LOWER EXTREMITY VEINS LEFT    CLINICAL HISTORY:  Pain in left leg    TECHNIQUE:  Duplex and color flow Doppler evaluation and graded compression of the left lower extremity veins was performed.    COMPARISON:  None    FINDINGS:  Left thigh veins: The common femoral, femoral, popliteal, upper greater saphenous, and deep femoral veins are patent and free of thrombus. The veins are normally compressible and have normal phasic flow and augmentation response.    Left calf veins: The visualized calf veins are patent.    Contralateral CFV: The contralateral (right) common femoral vein is patent and free of  thrombus.    Miscellaneous: None                                       Pending Diagnostic Studies:       None           Medications:       Medication List        START taking these medications      oxyCODONE-acetaminophen 7.5-325 mg per tablet  Commonly known as: PERCOCET  Take 1 tablet by mouth every 6 (six) hours as needed.            CONTINUE taking these medications      ergocalciferol 50,000 unit Cap  Commonly known as: ERGOCALCIFEROL     folic acid 1 MG tablet  Commonly known as: FOLVITE     senna-docusate 8.6-50 mg 8.6-50 mg per tablet  Commonly known as: PERICOLACE  Take 1 tablet by mouth 2 (two) times daily.            STOP taking these medications      valACYclovir 500 MG tablet  Commonly known as: VALTREX               Where to Get Your Medications        These medications were sent to [facility] [address] [place] [address]      Hours: Mon-Fri, 8a-5:30p Phone: [phone]   oxyCODONE-acetaminophen 7.5-325 mg per tablet          Indwelling Lines/Drains at time of discharge:   Lines/Drains/Airways       None                   Time spent on the discharge of patient: 91 minutes         Sandeep Hillman MD  Department of Hospital Medicine  [facility]

## 2024-09-03 NOTE — PLAN OF CARE
[id number]  Pain pump no longer in use  VSS     No harm or injuries  Cardiac monitoring in use, pt is NSR, tele monitor # [id number]   Purposeful [id number]     IVFs  Bed locked at lowest position  Call light within reach     Chart check complete  Will cont with POC

## 2024-09-06 LAB
BACTERIA BLD CULT: NORMAL
BACTERIA BLD CULT: NORMAL

## 2025-02-18 NOTE — PLAN OF CARE
Discussed poc with pt, pt verbalized understanding    Purposeful rounding every 2hours    VS wnl  Cardiac monitoring in use, pt is NSR, tele monitor # [id number]  Fall precautions in place, remains injury free  Pain under control with PCA meds    IVFs  Bed locked at lowest position  Call light within reach    Chart check complete  Will cont with POC     Last Appointment:  1/13/2025  Future Appointments   Date Time Provider Department Center   7/14/2025 11:45 AM Dominga Jones, DO [facility]

## 2025-06-23 ENCOUNTER — HOSPITAL ENCOUNTER (INPATIENT)
Facility: HOSPITAL | Age: 30
LOS: 2 days | Discharge: HOME | DRG: 812 | End: 2025-06-25
Attending: EMERGENCY MEDICINE | Admitting: HOSPITALIST
Payer: MEDICAID

## 2025-06-23 DIAGNOSIS — R07.9 CHEST PAIN: ICD-10-CM

## 2025-06-23 DIAGNOSIS — D57.00 SICKLE CELL ANEMIA WITH PAIN: Primary | ICD-10-CM

## 2025-06-23 PROBLEM — E87.6 HYPOKALEMIA: Status: ACTIVE | Noted: 2025-06-23

## 2025-06-23 LAB
ABSOLUTE EOSINOPHIL (OHS): 0.2 K/UL
ABSOLUTE EOSINOPHIL (OHS): 0.3 K/UL
ABSOLUTE MONOCYTE (OHS): 1.15 K/UL (ref 0.3–1)
ABSOLUTE MONOCYTE (OHS): 1.49 K/UL (ref 0.3–1)
ABSOLUTE NEUTROPHIL COUNT (OHS): 6.78 K/UL (ref 1.8–7.7)
ABSOLUTE NEUTROPHIL COUNT (OHS): 8.49 K/UL (ref 1.8–7.7)
ALBUMIN SERPL BCP-MCNC: 3.8 G/DL (ref 3.5–5.2)
ALBUMIN SERPL BCP-MCNC: 3.9 G/DL (ref 3.5–5.2)
ALP SERPL-CCNC: 66 UNIT/L (ref 40–150)
ALP SERPL-CCNC: 69 UNIT/L (ref 40–150)
ALT SERPL W/O P-5'-P-CCNC: 25 UNIT/L (ref 10–44)
ALT SERPL W/O P-5'-P-CCNC: 29 UNIT/L (ref 10–44)
AMPHET UR QL SCN: NEGATIVE
ANION GAP (OHS): 11 MMOL/L (ref 8–16)
ANION GAP (OHS): 11 MMOL/L (ref 8–16)
AST SERPL-CCNC: 40 UNIT/L (ref 11–45)
AST SERPL-CCNC: 41 UNIT/L (ref 11–45)
B-HCG UR QL: NEGATIVE
BACTERIA #/AREA URNS AUTO: ABNORMAL /HPF
BARBITURATE SCN PRESENT UR: NEGATIVE
BASOPHILS # BLD AUTO: 0.12 K/UL
BASOPHILS # BLD AUTO: 0.14 K/UL
BASOPHILS NFR BLD AUTO: 0.9 %
BASOPHILS NFR BLD AUTO: 0.9 %
BENZODIAZ UR QL SCN: NEGATIVE
BILIRUB SERPL-MCNC: 2.8 MG/DL (ref 0.1–1)
BILIRUB SERPL-MCNC: 3.4 MG/DL (ref 0.1–1)
BILIRUB UR QL STRIP.AUTO: NEGATIVE
BUN SERPL-MCNC: 7 MG/DL (ref 6–20)
BUN SERPL-MCNC: 8 MG/DL (ref 6–20)
CALCIUM SERPL-MCNC: 8.8 MG/DL (ref 8.7–10.5)
CALCIUM SERPL-MCNC: 9.1 MG/DL (ref 8.7–10.5)
CANNABINOIDS UR QL SCN: NEGATIVE
CHLORIDE SERPL-SCNC: 106 MMOL/L (ref 95–110)
CHLORIDE SERPL-SCNC: 107 MMOL/L (ref 95–110)
CLARITY UR: ABNORMAL
CO2 SERPL-SCNC: 19 MMOL/L (ref 23–29)
CO2 SERPL-SCNC: 20 MMOL/L (ref 23–29)
COCAINE UR QL SCN: NEGATIVE
COLOR UR AUTO: YELLOW
CREAT SERPL-MCNC: 0.6 MG/DL (ref 0.5–1.4)
CREAT SERPL-MCNC: 0.6 MG/DL (ref 0.5–1.4)
CREAT UR-MCNC: 116.5 MG/DL (ref 15–325)
ERYTHROCYTE [DISTWIDTH] IN BLOOD BY AUTOMATED COUNT: 14.8 % (ref 11.5–14.5)
ERYTHROCYTE [DISTWIDTH] IN BLOOD BY AUTOMATED COUNT: 15.3 % (ref 11.5–14.5)
GFR SERPLBLD CREATININE-BSD FMLA CKD-EPI: >60 ML/MIN/1.73/M2
GFR SERPLBLD CREATININE-BSD FMLA CKD-EPI: >60 ML/MIN/1.73/M2
GLUCOSE SERPL-MCNC: 217 MG/DL (ref 70–110)
GLUCOSE SERPL-MCNC: 83 MG/DL (ref 70–110)
GLUCOSE UR QL STRIP: NEGATIVE
HCT VFR BLD AUTO: 27.8 % (ref 37–48.5)
HCT VFR BLD AUTO: 32.8 % (ref 37–48.5)
HGB BLD-MCNC: 10.3 GM/DL (ref 12–16)
HGB BLD-MCNC: 12.1 GM/DL (ref 12–16)
HGB UR QL STRIP: NEGATIVE
HOLD SPECIMEN: NORMAL
IMM GRANULOCYTES # BLD AUTO: 0.06 K/UL (ref 0–0.04)
IMM GRANULOCYTES # BLD AUTO: 0.07 K/UL (ref 0–0.04)
IMM GRANULOCYTES NFR BLD AUTO: 0.5 % (ref 0–0.5)
IMM GRANULOCYTES NFR BLD AUTO: 0.5 % (ref 0–0.5)
KETONES UR QL STRIP: NEGATIVE
LEUKOCYTE ESTERASE UR QL STRIP: ABNORMAL
LYMPHOCYTES # BLD AUTO: 4.37 K/UL (ref 1–4.8)
LYMPHOCYTES # BLD AUTO: 4.93 K/UL (ref 1–4.8)
MCH RBC QN AUTO: 30.3 PG (ref 27–31)
MCH RBC QN AUTO: 30.3 PG (ref 27–31)
MCHC RBC AUTO-ENTMCNC: 36.9 G/DL (ref 32–36)
MCHC RBC AUTO-ENTMCNC: 37.1 G/DL (ref 32–36)
MCV RBC AUTO: 82 FL (ref 82–98)
MCV RBC AUTO: 82 FL (ref 82–98)
METHADONE UR QL SCN: NEGATIVE
MICROSCOPIC COMMENT: ABNORMAL
NITRITE UR QL STRIP: NEGATIVE
NUCLEATED RBC (/100WBC) (OHS): 1 /100 WBC
NUCLEATED RBC (/100WBC) (OHS): 1 /100 WBC
OPIATES UR QL SCN: ABNORMAL
PCP UR QL: NEGATIVE
PH UR STRIP: 6 [PH]
PLATELET # BLD AUTO: 251 K/UL (ref 150–450)
PLATELET # BLD AUTO: 304 K/UL (ref 150–450)
PLATELET BLD QL SMEAR: NORMAL
PMV BLD AUTO: 10.5 FL (ref 9.2–12.9)
PMV BLD AUTO: 9.9 FL (ref 9.2–12.9)
POLYCHROMASIA BLD QL SMEAR: NORMAL
POTASSIUM SERPL-SCNC: 3.4 MMOL/L (ref 3.5–5.1)
POTASSIUM SERPL-SCNC: 3.6 MMOL/L (ref 3.5–5.1)
PROT SERPL-MCNC: 7.8 GM/DL (ref 6–8.4)
PROT SERPL-MCNC: 8.2 GM/DL (ref 6–8.4)
PROT UR QL STRIP: NEGATIVE
RBC # BLD AUTO: 3.4 M/UL (ref 4–5.4)
RBC # BLD AUTO: 4 M/UL (ref 4–5.4)
RELATIVE EOSINOPHIL (OHS): 1.5 %
RELATIVE EOSINOPHIL (OHS): 2 %
RELATIVE LYMPHOCYTE (OHS): 29.4 % (ref 18–48)
RELATIVE LYMPHOCYTE (OHS): 37.2 % (ref 18–48)
RELATIVE MONOCYTE (OHS): 10 % (ref 4–15)
RELATIVE MONOCYTE (OHS): 8.7 % (ref 4–15)
RELATIVE NEUTROPHIL (OHS): 51.2 % (ref 38–73)
RELATIVE NEUTROPHIL (OHS): 57.2 % (ref 38–73)
RETICS/RBC NFR AUTO: 5.2 % (ref 0.5–2.5)
SCHISTOCYTES BLD QL SMEAR: NORMAL
SODIUM SERPL-SCNC: 137 MMOL/L (ref 136–145)
SODIUM SERPL-SCNC: 137 MMOL/L (ref 136–145)
SP GR UR STRIP: 1.01
SPHEROCYTES BLD QL SMEAR: NORMAL
SQUAMOUS #/AREA URNS AUTO: 5 /HPF
STOMATOCYTES BLD QL SMEAR: PRESENT
TARGETS BLD QL SMEAR: NORMAL
UROBILINOGEN UR STRIP-ACNC: NEGATIVE EU/DL
WBC # BLD AUTO: 13.24 K/UL (ref 3.9–12.7)
WBC # BLD AUTO: 14.86 K/UL (ref 3.9–12.7)
WBC #/AREA URNS AUTO: 24 /HPF (ref 0–5)

## 2025-06-23 PROCEDURE — 11000001 HC ACUTE MED/SURG PRIVATE ROOM

## 2025-06-23 PROCEDURE — 85025 COMPLETE CBC W/AUTO DIFF WBC: CPT | Performed by: EMERGENCY MEDICINE

## 2025-06-23 PROCEDURE — 80053 COMPREHEN METABOLIC PANEL: CPT | Performed by: EMERGENCY MEDICINE

## 2025-06-23 PROCEDURE — 25000003 PHARM REV CODE 250: Performed by: NURSE PRACTITIONER

## 2025-06-23 PROCEDURE — 96365 THER/PROPH/DIAG IV INF INIT: CPT

## 2025-06-23 PROCEDURE — 85045 AUTOMATED RETICULOCYTE COUNT: CPT | Performed by: EMERGENCY MEDICINE

## 2025-06-23 PROCEDURE — 80053 COMPREHEN METABOLIC PANEL: CPT | Performed by: NURSE PRACTITIONER

## 2025-06-23 PROCEDURE — 87086 URINE CULTURE/COLONY COUNT: CPT | Performed by: EMERGENCY MEDICINE

## 2025-06-23 PROCEDURE — 63600175 PHARM REV CODE 636 W HCPCS: Performed by: EMERGENCY MEDICINE

## 2025-06-23 PROCEDURE — 25000003 PHARM REV CODE 250: Performed by: EMERGENCY MEDICINE

## 2025-06-23 PROCEDURE — 63600175 PHARM REV CODE 636 W HCPCS: Performed by: HOSPITALIST

## 2025-06-23 PROCEDURE — 27000221 HC OXYGEN, UP TO 24 HOURS

## 2025-06-23 PROCEDURE — 85025 COMPLETE CBC W/AUTO DIFF WBC: CPT | Performed by: NURSE PRACTITIONER

## 2025-06-23 PROCEDURE — 96376 TX/PRO/DX INJ SAME DRUG ADON: CPT

## 2025-06-23 PROCEDURE — 81001 URINALYSIS AUTO W/SCOPE: CPT | Performed by: EMERGENCY MEDICINE

## 2025-06-23 PROCEDURE — 94761 N-INVAS EAR/PLS OXIMETRY MLT: CPT

## 2025-06-23 PROCEDURE — 21400001 HC TELEMETRY ROOM

## 2025-06-23 PROCEDURE — 80307 DRUG TEST PRSMV CHEM ANLYZR: CPT | Performed by: EMERGENCY MEDICINE

## 2025-06-23 PROCEDURE — 81025 URINE PREGNANCY TEST: CPT | Performed by: EMERGENCY MEDICINE

## 2025-06-23 PROCEDURE — 99900035 HC TECH TIME PER 15 MIN (STAT)

## 2025-06-23 PROCEDURE — 94799 UNLISTED PULMONARY SVC/PX: CPT

## 2025-06-23 PROCEDURE — 99285 EMERGENCY DEPT VISIT HI MDM: CPT | Mod: 25

## 2025-06-23 PROCEDURE — 96375 TX/PRO/DX INJ NEW DRUG ADDON: CPT

## 2025-06-23 PROCEDURE — S5010 5% DEXTROSE AND 0.45% SALINE: HCPCS | Performed by: NURSE PRACTITIONER

## 2025-06-23 RX ORDER — LISDEXAMFETAMINE DIMESYLATE 30 MG/1
30 CAPSULE ORAL EVERY MORNING
COMMUNITY
Start: 2025-05-19

## 2025-06-23 RX ORDER — PROMETHAZINE HYDROCHLORIDE 25 MG/1
25 TABLET ORAL EVERY 6 HOURS PRN
Status: DISCONTINUED | OUTPATIENT
Start: 2025-06-23 | End: 2025-06-25 | Stop reason: HOSPADM

## 2025-06-23 RX ORDER — CETIRIZINE HYDROCHLORIDE 10 MG/1
10 TABLET ORAL EVERY MORNING
Status: DISCONTINUED | OUTPATIENT
Start: 2025-06-23 | End: 2025-06-25 | Stop reason: HOSPADM

## 2025-06-23 RX ORDER — HYDROXYUREA 500 MG/1
1500 CAPSULE ORAL
COMMUNITY
Start: 2024-07-10

## 2025-06-23 RX ORDER — DIPHENHYDRAMINE HCL 25 MG
25 CAPSULE ORAL EVERY 4 HOURS PRN
Status: DISCONTINUED | OUTPATIENT
Start: 2025-06-23 | End: 2025-06-25 | Stop reason: HOSPADM

## 2025-06-23 RX ORDER — HYDROMORPHONE HYDROCHLORIDE 1 MG/ML
1 INJECTION, SOLUTION INTRAMUSCULAR; INTRAVENOUS; SUBCUTANEOUS
Refills: 0 | Status: COMPLETED | OUTPATIENT
Start: 2025-06-23 | End: 2025-06-23

## 2025-06-23 RX ORDER — HYDROXYUREA 500 MG/1
1500 CAPSULE ORAL DAILY
Status: DISCONTINUED | OUTPATIENT
Start: 2025-06-23 | End: 2025-06-25 | Stop reason: HOSPADM

## 2025-06-23 RX ORDER — ONDANSETRON HYDROCHLORIDE 2 MG/ML
4 INJECTION, SOLUTION INTRAVENOUS EVERY 6 HOURS PRN
Status: DISCONTINUED | OUTPATIENT
Start: 2025-06-23 | End: 2025-06-25 | Stop reason: HOSPADM

## 2025-06-23 RX ORDER — IBUPROFEN 200 MG
24 TABLET ORAL
Status: DISCONTINUED | OUTPATIENT
Start: 2025-06-23 | End: 2025-06-25 | Stop reason: HOSPADM

## 2025-06-23 RX ORDER — HYDROMORPHONE HCL IN 0.9% NACL 6 MG/30 ML
PATIENT CONTROLLED ANALGESIA SYRINGE INTRAVENOUS CONTINUOUS
Refills: 0 | Status: DISCONTINUED | OUTPATIENT
Start: 2025-06-23 | End: 2025-06-25 | Stop reason: HOSPADM

## 2025-06-23 RX ORDER — ALUMINUM HYDROXIDE, MAGNESIUM HYDROXIDE, AND SIMETHICONE 1200; 120; 1200 MG/30ML; MG/30ML; MG/30ML
30 SUSPENSION ORAL 4 TIMES DAILY PRN
Status: DISCONTINUED | OUTPATIENT
Start: 2025-06-23 | End: 2025-06-25 | Stop reason: HOSPADM

## 2025-06-23 RX ORDER — SODIUM CHLORIDE 0.9 % (FLUSH) 0.9 %
3 SYRINGE (ML) INJECTION EVERY 8 HOURS PRN
Status: DISCONTINUED | OUTPATIENT
Start: 2025-06-23 | End: 2025-06-25 | Stop reason: HOSPADM

## 2025-06-23 RX ORDER — AMOXICILLIN 250 MG
1 CAPSULE ORAL 2 TIMES DAILY
Status: DISCONTINUED | OUTPATIENT
Start: 2025-06-23 | End: 2025-06-25 | Stop reason: HOSPADM

## 2025-06-23 RX ORDER — ACETAMINOPHEN 325 MG/1
650 TABLET ORAL EVERY 4 HOURS PRN
Status: DISCONTINUED | OUTPATIENT
Start: 2025-06-23 | End: 2025-06-25 | Stop reason: HOSPADM

## 2025-06-23 RX ORDER — GLUCAGON 1 MG
1 KIT INJECTION
Status: DISCONTINUED | OUTPATIENT
Start: 2025-06-23 | End: 2025-06-25 | Stop reason: HOSPADM

## 2025-06-23 RX ORDER — DIPHENHYDRAMINE HYDROCHLORIDE 50 MG/ML
25 INJECTION, SOLUTION INTRAMUSCULAR; INTRAVENOUS
Status: COMPLETED | OUTPATIENT
Start: 2025-06-23 | End: 2025-06-23

## 2025-06-23 RX ORDER — DEXTROSE MONOHYDRATE AND SODIUM CHLORIDE 5; .45 G/100ML; G/100ML
INJECTION, SOLUTION INTRAVENOUS CONTINUOUS
Status: DISCONTINUED | OUTPATIENT
Start: 2025-06-23 | End: 2025-06-25 | Stop reason: HOSPADM

## 2025-06-23 RX ORDER — POLYETHYLENE GLYCOL 3350 17 G/17G
17 POWDER, FOR SOLUTION ORAL DAILY PRN
Status: DISCONTINUED | OUTPATIENT
Start: 2025-06-23 | End: 2025-06-25 | Stop reason: HOSPADM

## 2025-06-23 RX ORDER — IBUPROFEN 200 MG
16 TABLET ORAL
Status: DISCONTINUED | OUTPATIENT
Start: 2025-06-23 | End: 2025-06-25 | Stop reason: HOSPADM

## 2025-06-23 RX ORDER — FOLIC ACID 1 MG/1
1 TABLET ORAL DAILY
Status: DISCONTINUED | OUTPATIENT
Start: 2025-06-23 | End: 2025-06-25 | Stop reason: HOSPADM

## 2025-06-23 RX ORDER — CETIRIZINE HYDROCHLORIDE 10 MG/1
10 TABLET ORAL EVERY MORNING
COMMUNITY
Start: 2025-02-20 | End: 2026-02-20

## 2025-06-23 RX ORDER — OXYCODONE AND ACETAMINOPHEN 7.5; 325 MG/1; MG/1
1 TABLET ORAL EVERY 8 HOURS PRN
Status: ON HOLD | COMMUNITY
Start: 2025-02-20 | End: 2025-06-25

## 2025-06-23 RX ORDER — NALOXONE HCL 0.4 MG/ML
0.2 VIAL (ML) INJECTION
Status: DISCONTINUED | OUTPATIENT
Start: 2025-06-23 | End: 2025-06-25 | Stop reason: HOSPADM

## 2025-06-23 RX ADMIN — DEXTROSE AND SODIUM CHLORIDE: 5; 450 INJECTION, SOLUTION INTRAVENOUS at 05:06

## 2025-06-23 RX ADMIN — HYDROMORPHONE HYDROCHLORIDE 1 MG: 1 INJECTION, SOLUTION INTRAMUSCULAR; INTRAVENOUS; SUBCUTANEOUS at 02:06

## 2025-06-23 RX ADMIN — SENNOSIDES AND DOCUSATE SODIUM 1 TABLET: 50; 8.6 TABLET ORAL at 08:06

## 2025-06-23 RX ADMIN — FOLIC ACID 1 MG: 1 TABLET ORAL at 08:06

## 2025-06-23 RX ADMIN — PROMETHAZINE HYDROCHLORIDE 12.5 MG: 25 INJECTION INTRAMUSCULAR; INTRAVENOUS at 01:06

## 2025-06-23 RX ADMIN — CETIRIZINE HYDROCHLORIDE 10 MG: 10 TABLET, FILM COATED ORAL at 06:06

## 2025-06-23 RX ADMIN — Medication: at 05:06

## 2025-06-23 RX ADMIN — DEXTROSE AND SODIUM CHLORIDE: 5; 450 INJECTION, SOLUTION INTRAVENOUS at 04:06

## 2025-06-23 RX ADMIN — HYDROMORPHONE HYDROCHLORIDE 1 MG: 1 INJECTION, SOLUTION INTRAMUSCULAR; INTRAVENOUS; SUBCUTANEOUS at 03:06

## 2025-06-23 RX ADMIN — HYDROXYUREA 1500 MG: 500 CAPSULE ORAL at 08:06

## 2025-06-23 RX ADMIN — HYDROMORPHONE HYDROCHLORIDE 1 MG: 1 INJECTION, SOLUTION INTRAMUSCULAR; INTRAVENOUS; SUBCUTANEOUS at 01:06

## 2025-06-23 RX ADMIN — DIPHENHYDRAMINE HYDROCHLORIDE 25 MG: 50 INJECTION, SOLUTION INTRAMUSCULAR; INTRAVENOUS at 01:06

## 2025-06-23 NOTE — ASSESSMENT & PLAN NOTE
Anemia is likely due to chronic disease due to sickle cell disease. Most recent hemoglobin and hematocrit are listed below.  Recent Labs     06/23/25  0123 06/23/25  0541   HGB 12.1 10.3*   HCT 32.8* 27.8*     Plan  -Monitor serial CBC: Daily  -Transfuse PRBC if patient becomes hemodynamically unstable, symptomatic or H/H drops below 7/21.  -Dilaudid PCA ordered  -PRN Zofran, phenergan  -Hydrate with IV fluids

## 2025-06-23 NOTE — ASSESSMENT & PLAN NOTE
Patient's most recent potassium results are listed below.   Recent Labs     06/23/25  0154 06/23/25  0634   K 3.6 3.4*     Plan  -Replete potassium per protocol  -Monitor potassium Daily

## 2025-06-23 NOTE — ED PROVIDER NOTES
SCRIBE #1 NOTE: I, Marek Levine, am scribing for, and in the presence of, Tru Kaur MD. I have scribed the HPI, ROS, and PEx.     SCRIBE #2 NOTE: I, Ryan Khan & Jorje Lares, am scribing for, and in the presence of,  Tru Kaur MD. I have scribed the remaining portions of the note not scribed by Scribe #1.      History     Chief Complaint   Patient presents with    Sickle Cell Pain Crisis     Pt reports bilateral arm pain s/t SCD      Review of patient's allergies indicates:  No Known Allergies      History of Present Illness     HPI    6/23/2025, 1:27 AM  History obtained from the patient and medical records      History of Present Illness: Francy Zuleta is a 30 y.o. female patient with a PMHx of medical marijuana use and sickle cell disease who presents to the Emergency Department for evaluation of BUE pain which began over the last few days. Pt sees Dr. Savita Arthur for Hem/Onc. Symptoms are constant and moderate in severity. No mitigating or exacerbating factors reported. No associated sxs reported beyond those discussed. Patient denies any fever, chills, CP or SOB. No prior Tx specified.  No further complaints or concerns at this time.       Arrival mode: Personal Transportation    PCP: No, Primary Doctor        Past Medical History:  Past Medical History:   Diagnosis Date    ADD (attention deficit disorder)     Medical marijuana use     Sickle cell disease        Past Surgical History:  History reviewed. No pertinent surgical history.      Family History:  No family history on file.    Social History:  Social History     Tobacco Use    Smoking status: Never    Smokeless tobacco: Never   Vaping Use    Vaping status: Never Used   Substance and Sexual Activity    Alcohol use: Not Currently    Drug use: Never    Sexual activity: Not Currently        Review of Systems     Review of Systems   Constitutional:  Negative for chills and fever.   HENT:  Negative for sore throat.     Respiratory:  Negative for shortness of breath.    Cardiovascular:  Negative for chest pain.   Gastrointestinal:  Negative for nausea.   Genitourinary:  Negative for dysuria.   Musculoskeletal:  Positive for myalgias (BUE). Negative for back pain.   Skin:  Negative for rash.   Neurological:  Negative for weakness.   Hematological:  Does not bruise/bleed easily.   All other systems reviewed and are negative.       Physical Exam     Initial Vitals [06/22/25 2326]   BP Pulse Resp Temp SpO2   118/72 97 16 98 °F (36.7 °C) (!) 85 %      MAP       --          Physical Exam  Nursing Notes and Vital Signs Reviewed.  Constitutional: Patient is in mild distress. Well-developed and well-nourished.  Head: Atraumatic. Normocephalic.  Eyes: PERRL. EOM intact. Conjunctivae are not pale. No scleral icterus.  ENT: Mucous membranes are moist. Oropharynx is clear and symmetric.    Neck: Supple. Full ROM. No lymphadenopathy.  Cardiovascular: Regular rate. Regular rhythm. No murmurs, rubs, or gallops. Distal pulses are 2+ and symmetric.  Pulmonary/Chest: No respiratory distress. Clear to auscultation bilaterally. No wheezing or rales.  Abdominal: Soft and non-distended. There is no tenderness.  No rebound, guarding, or rigidity. Good bowel sounds.  Genitourinary: No CVA tenderness.  Musculoskeletal: Moves all extremities. No obvious deformities. No edema. No calf tenderness.  Skin: Warm and dry.  Neurological:  Alert, awake, and appropriate.  Normal speech.  No acute focal neurological deficits are appreciated.  Psychiatric: Normal affect. Good eye contact. Appropriate in content.     ED Course   Procedures  ED Vital Signs:  Vitals:    06/22/25 2326 06/22/25 2327 06/23/25 0131 06/23/25 0140   BP: 118/72  107/70    Pulse: 97  95    Resp: 16   16   Temp: 98 °F (36.7 °C)      TempSrc: Oral      SpO2: (!) 85% 97% 99%    Weight: 53.1 kg (117 lb)       06/23/25 0201 06/23/25 0222 06/23/25 0301 06/23/25 0313   BP: 110/75  113/71    Pulse: 85   92    Resp:  16  16   Temp:       TempSrc:       SpO2: 97%  96%    Weight:           Abnormal Lab Results:  Labs Reviewed   COMPREHENSIVE METABOLIC PANEL - Abnormal       Result Value    Sodium 137      Potassium 3.6      Chloride 107      CO2 19 (*)     Glucose 83      BUN 8      Creatinine 0.6      Calcium 9.1      Protein Total 8.2      Albumin 3.9      Bilirubin Total 2.8 (*)     ALP 69      AST 41      ALT 29      Anion Gap 11      eGFR >60     RETICULOCYTES - Abnormal    Retic Count, Automated 5.2 (*)    CBC WITH DIFFERENTIAL - Abnormal    WBC 14.86 (*)     RBC 4.00      HGB 12.1      HCT 32.8 (*)     MCV 82      MCH 30.3      MCHC 36.9 (*)     RDW 15.3 (*)     Platelet Count 304      MPV 10.5      Nucleated RBC 1 (*)     Neut % 57.2      Lymph % 29.4      Mono % 10.0      Eos % 2.0      Basophil % 0.9      Imm Grans % 0.5      Neut # 8.49 (*)     Lymph # 4.37      Mono # 1.49 (*)     Eos # 0.30      Baso # 0.14      Imm Grans # 0.07 (*)    CBC W/ AUTO DIFFERENTIAL    Narrative:     The following orders were created for panel order CBC auto differential.  Procedure                               Abnormality         Status                     ---------                               -----------         ------                     CBC with Differential[[id number]]       Abnormal            Final result                 Please view results for these tests on the individual orders.   MORPHOLOGY    Platelet Estimate Appears Normal      Fragmented Cells Occasional      Polychromasia Occasional      Target Cell Occasional      Stomatocytes Present      Spherocyte Occasional     URINALYSIS, REFLEX TO URINE CULTURE   PREGNANCY TEST, URINE RAPID   DRUG SCREEN PANEL, URINE EMERGENCY        All Lab Results:  Results for orders placed or performed during the hospital encounter of 06/23/25   Reticulocytes    Collection Time: 06/23/25  1:23 AM   Result Value Ref Range    Retic Count, Automated 5.2 (H) 0.5 - 2.5 %   CBC with  Differential    Collection Time: 06/23/25  1:23 AM   Result Value Ref Range    WBC 14.86 (H) 3.90 - 12.70 K/uL    RBC 4.00 4.00 - 5.40 M/uL    HGB 12.1 12.0 - 16.0 gm/dL    HCT 32.8 (L) 37.0 - 48.5 %    MCV 82 82 - 98 fL    MCH 30.3 27.0 - 31.0 pg    MCHC 36.9 (H) 32.0 - 36.0 g/dL    RDW 15.3 (H) 11.5 - 14.5 %    Platelet Count 304 150 - 450 K/uL    MPV 10.5 9.2 - 12.9 fL    Nucleated RBC 1 (H) <=0 /100 WBC    Neut % 57.2 38 - 73 %    Lymph % 29.4 18 - 48 %    Mono % 10.0 4 - 15 %    Eos % 2.0 <=8 %    Basophil % 0.9 <=1.9 %    Imm Grans % 0.5 0.0 - 0.5 %    Neut # 8.49 (H) 1.8 - 7.7 K/uL    Lymph # 4.37 1 - 4.8 K/uL    Mono # 1.49 (H) 0.3 - 1 K/uL    Eos # 0.30 <=0.5 K/uL    Baso # 0.14 <=0.2 K/uL    Imm Grans # 0.07 (H) 0.00 - 0.04 K/uL   Morphology    Collection Time: 06/23/25  1:23 AM    Specimen: Blood   Result Value Ref Range    Platelet Estimate Appears Normal      Fragmented Cells Occasional     Polychromasia Occasional     Target Cell Occasional     Stomatocytes Present     Spherocyte Occasional    Comprehensive metabolic panel    Collection Time: 06/23/25  1:54 AM   Result Value Ref Range    Sodium 137 136 - 145 mmol/L    Potassium 3.6 3.5 - 5.1 mmol/L    Chloride 107 95 - 110 mmol/L    CO2 19 (L) 23 - 29 mmol/L    Glucose 83 70 - 110 mg/dL    BUN 8 6 - 20 mg/dL    Creatinine 0.6 0.5 - 1.4 mg/dL    Calcium 9.1 8.7 - 10.5 mg/dL    Protein Total 8.2 6.0 - 8.4 gm/dL    Albumin 3.9 3.5 - 5.2 g/dL    Bilirubin Total 2.8 (H) 0.1 - 1.0 mg/dL    ALP 69 40 - 150 unit/L    AST 41 11 - 45 unit/L    ALT 29 10 - 44 unit/L    Anion Gap 11 8 - 16 mmol/L    eGFR >60 >60 mL/min/1.73/m2       Imaging Results:  Imaging Results    None          No EKG ordered           The Emergency Provider reviewed the vital signs and test results, which are outlined above.     ED Discussion     3:22 AM: Discussed case with Ileana Schmitt NP ([facility]). Dr. Kay agrees with current care and management of pt and accepts  admission.   Admitting Service: Hospital Medicine  Admitting Physician: Dr. Kay  Admit to: Inpatient Med/Tele    3:29 AM: Re-evaluated pt. I have discussed test results, shared treatment plan, and the need for admission with patient/family/caretaker at bedside. Pt and/or family/caretaker express understanding at this time and agree with all information. All questions answered. Pt/caretaker/family member(s) have no further questions or concerns at this time. Pt is ready for admit.              Medical Decision Making  DDx: sickle cell anemia, pain    Amount and/or Complexity of Data Reviewed  Labs: ordered. Decision-making details documented in ED Course.  Discussion of management or test interpretation with external provider(s): Continued pain after treatment in the ED.  Consult HM for admission.      Risk  Prescription drug management.  Decision regarding hospitalization.                ED Medication(s):  Medications   HYDROmorphone injection 1 mg (1 mg Intravenous Given 6/23/25 0140)   promethazine (PHENERGAN) 12.5 mg in 0.9% NaCl 50 mL IVPB (0 mg Intravenous Stopped 6/23/25 0213)   diphenhydrAMINE injection 25 mg (25 mg Intravenous Given 6/23/25 0138)   HYDROmorphone injection 1 mg (1 mg Intravenous Given 6/23/25 0222)   HYDROmorphone injection 1 mg (1 mg Intravenous Given 6/23/25 0313)       New Prescriptions    No medications on file               Scribe Attestation:   Scribe #1: I performed the above scribed service and the documentation accurately describes the services I performed. I attest to the accuracy of the note.     Attending:   Physician Attestation Statement for Scribe #1: I, Tru Kaur MD, personally performed the services described in this documentation, as scribed by Marek Levine, in my presence, and it is both accurate and complete.       Scribe Attestation:   Scribe #2: I performed the above scribed service and the documentation accurately describes the services I performed.  I attest to the accuracy of the note.    Attending Attestation:           Physician Attestation for Scribe:    Physician Attestation Statement for Scribe #2: I, Tru Kaur MD, reviewed documentation, as scribed by Ryan Khan & Jorje Lares in my presence, and it is both accurate and complete. I also acknowledge and confirm the content of the note done by Scribe #1.           Clinical Impression       ICD-10-CM ICD-9-CM   1. Sickle cell anemia with pain  D57.00 282.62       Disposition:   Disposition: Admitted  Condition: Tru Vail MD  06/23/25 0350

## 2025-06-23 NOTE — SUBJECTIVE & OBJECTIVE
Past Medical History:   Diagnosis Date    ADD (attention deficit disorder)     Medical marijuana use     Sickle cell disease        History reviewed. No pertinent surgical history.    Review of patient's allergies indicates:  No Known Allergies    No current facility-administered medications on file prior to encounter.     Current Outpatient Medications on File Prior to Encounter   Medication Sig    cetirizine (ZYRTEC) 10 MG tablet Take 10 mg by mouth every morning.    hydroxyurea (HYDREA) 500 mg Cap Take 1,500 mg by mouth.    oxyCODONE-acetaminophen (PERCOCET) 7.5-325 mg per tablet Take 1 tablet by mouth every 8 (eight) hours as needed.    VYVANSE 30 mg capsule Take 30 mg by mouth.    ergocalciferol (ERGOCALCIFEROL) 50,000 unit Cap Take 50,000 Units by mouth every 7 days.    folic acid (FOLVITE) 1 MG tablet Take 1 mg by mouth once daily.    senna-docusate 8.6-50 mg (PERICOLACE) 8.6-50 mg per tablet Take 1 tablet by mouth 2 (two) times daily.     Family History       Problem Relation (Age of Onset)    Bipolar disorder Mother    No Known Problems Father          Tobacco Use    Smoking status: Never    Smokeless tobacco: Never   Vaping Use    Vaping status: Never Used   Substance and Sexual Activity    Alcohol use: Not Currently    Drug use: Yes     Types: Marijuana     Comment: medical marijuana    Sexual activity: Not Currently     Review of Systems   Constitutional:  Positive for activity change. Negative for appetite change, chills, diaphoresis, fatigue and fever.   HENT: Negative.  Negative for congestion and sore throat.    Eyes: Negative.    Respiratory: Negative.  Negative for cough, shortness of breath and wheezing.    Cardiovascular: Negative.  Negative for chest pain, palpitations and leg swelling.   Gastrointestinal:  Positive for nausea. Negative for abdominal distention, abdominal pain and vomiting.   Endocrine: Negative.    Genitourinary: Negative.  Negative for difficulty urinating, dysuria, frequency,  hematuria and urgency.   Musculoskeletal:  Positive for arthralgias.        Bilateral arm pain   Skin: Negative.    Neurological: Negative.  Negative for dizziness, syncope, speech difficulty, weakness, light-headedness and headaches.   Psychiatric/Behavioral: Negative.       Objective:     Vital Signs (Most Recent):  Temp: 98 °F (36.7 °C) (06/22/25 2326)  Pulse: 93 (06/23/25 0406)  Resp: 16 (06/23/25 0313)  BP: 115/73 (06/23/25 0406)  SpO2: 95 % (06/23/25 0406) Vital Signs (24h Range):  Temp:  [98 °F (36.7 °C)] 98 °F (36.7 °C)  Pulse:  [85-97] 93  Resp:  [16] 16  SpO2:  [85 %-99 %] 95 %  BP: (107-118)/(70-75) 115/73     Weight: 53.1 kg (117 lb)  Body mass index is 21.4 kg/m².     Physical Exam  Vitals reviewed.   Constitutional:       Appearance: Normal appearance. She is ill-appearing. She is not toxic-appearing or diaphoretic.   HENT:      Head: Normocephalic.      Nose: Nose normal.      Mouth/Throat:      Mouth: Mucous membranes are moist.      Pharynx: Oropharynx is clear.   Eyes:      Extraocular Movements: Extraocular movements intact.      Pupils: Pupils are equal, round, and reactive to light.   Cardiovascular:      Rate and Rhythm: Normal rate and regular rhythm.      Pulses: Normal pulses.      Heart sounds: Normal heart sounds.   Pulmonary:      Effort: Pulmonary effort is normal. No respiratory distress.      Breath sounds: Normal breath sounds. No wheezing or rales.   Chest:      Chest wall: No tenderness.   Abdominal:      General: Bowel sounds are normal. There is no distension.      Palpations: Abdomen is soft.      Tenderness: There is no abdominal tenderness. There is no right CVA tenderness, left CVA tenderness, guarding or rebound.   Musculoskeletal:      Cervical back: Neck supple.      Right lower leg: No edema.      Left lower leg: No edema.   Skin:     General: Skin is warm and dry.      Capillary Refill: Capillary refill takes less than 2 seconds.   Neurological:      General: No focal  deficit present.      Mental Status: She is alert and oriented to person, place, and time.   Psychiatric:         Mood and Affect: Mood normal.         Behavior: Behavior normal.              CRANIAL NERVES     CN III, IV, VI   Pupils are equal, round, and reactive to light.       Significant Labs: All pertinent labs within the past 24 hours have been reviewed.    Bilirubin:   Recent Labs   Lab 06/23/25  0154   BILITOT 2.8*     CBC:   Recent Labs   Lab 06/23/25  0123   WBC 14.86*   HGB 12.1   HCT 32.8*        RETIC 5.2    CMP:   Recent Labs   Lab 06/23/25  0154      K 3.6      CO2 19*   GLU 83   BUN 8   CREATININE 0.6   CALCIUM 9.1   PROT 8.2   ALBUMIN 3.9   BILITOT 2.8*   ALKPHOS 69   AST 41   ALT 29   ANIONGAP 11       Significant Imaging: I have reviewed all pertinent imaging results/findings within the past 24 hours.

## 2025-06-23 NOTE — HPI
Patient is a 30-year-old female with past medical history significant for sickle cell disease, bipolar disorder, ADD, and use of medical marijuana who presented to ED with complaint of bilateral arm pain stating that she is having sickle cell crisis pain.  She reports that she normally takes Percocet to control her pain, however she has been taking it around the clock with no improvement.  Vital signs stable on room air.  She denies fever, chills, any recent illness, nasal congestion, sore throat, headaches, productive cough, nausea, vomiting, diarrhea, dysuria.  Lab workup showed WBC 14.86, hemoglobin 12.1, hematocrit 32.8, platelets 304, reticulocyte 5.2, total bilirubin 2.8, chemistry otherwise unremarkable. while in ED she was given IV Dilaudid 1 mg x 3 doses, IV Benadryl 25 mg, IV Phenergan 12.5 mg.  She continues to complain of pain and requests admission for pain control due to sickle cell pain crisis.

## 2025-06-23 NOTE — H&P
Former[facility] Emergency Dept.  [facility]  History & Physical    Patient Name: Francy Zuleta  MRN: [id number]  Patient Class: IP- Inpatient  Admission Date: 6/23/2025  Attending Physician: Denver Vera MD   Primary Care Provider: Maddie Primary Doctor         Patient information was obtained from patient, past medical records, and ER records.     Subjective:     Principal Problem:Sickle cell pain crisis    Chief Complaint:   Chief Complaint   Patient presents with    Sickle Cell Pain Crisis     Pt reports bilateral arm pain s/t SCD         HPI:   Patient is a 30-year-old female with past medical history significant for sickle cell disease, bipolar disorder, ADD, and use of medical marijuana who presented to ED with complaint of bilateral arm pain stating that she is having sickle cell crisis pain.  She reports that she normally takes Percocet to control her pain, however she has been taking it around the clock with no improvement.  Vital signs stable on room air.  She denies fever, chills, any recent illness, nasal congestion, sore throat, headaches, productive cough, nausea, vomiting, diarrhea, dysuria.  Lab workup showed WBC 14.86, hemoglobin 12.1, hematocrit 32.8, platelets 304, reticulocyte 5.2, total bilirubin 2.8, chemistry otherwise unremarkable. while in ED she was given IV Dilaudid 1 mg x 3 doses, IV Benadryl 25 mg, IV Phenergan 12.5 mg.  She continues to complain of pain and requests admission for pain control due to sickle cell pain crisis.    Past Medical History:   Diagnosis Date    ADD (attention deficit disorder)     Medical marijuana use     Sickle cell disease        History reviewed. No pertinent surgical history.    Review of patient's allergies indicates:  No Known Allergies    No current facility-administered medications on file prior to encounter.     Current Outpatient Medications on File Prior to Encounter   Medication Sig    cetirizine (ZYRTEC) 10 MG tablet Take 10 mg by mouth every  morning.    hydroxyurea (HYDREA) 500 mg Cap Take 1,500 mg by mouth.    oxyCODONE-acetaminophen (PERCOCET) 7.5-325 mg per tablet Take 1 tablet by mouth every 8 (eight) hours as needed.    VYVANSE 30 mg capsule Take 30 mg by mouth.    ergocalciferol (ERGOCALCIFEROL) 50,000 unit Cap Take 50,000 Units by mouth every 7 days.    folic acid (FOLVITE) 1 MG tablet Take 1 mg by mouth once daily.    senna-docusate 8.6-50 mg (PERICOLACE) 8.6-50 mg per tablet Take 1 tablet by mouth 2 (two) times daily.     Family History       Problem Relation (Age of Onset)    Bipolar disorder Mother    No Known Problems Father          Tobacco Use    Smoking status: Never    Smokeless tobacco: Never   Vaping Use    Vaping status: Never Used   Substance and Sexual Activity    Alcohol use: Not Currently    Drug use: Yes     Types: Marijuana     Comment: medical marijuana    Sexual activity: Not Currently     Review of Systems   Constitutional:  Positive for activity change. Negative for appetite change, chills, diaphoresis, fatigue and fever.   HENT: Negative.  Negative for congestion and sore throat.    Eyes: Negative.    Respiratory: Negative.  Negative for cough, shortness of breath and wheezing.    Cardiovascular: Negative.  Negative for chest pain, palpitations and leg swelling.   Gastrointestinal:  Positive for nausea. Negative for abdominal distention, abdominal pain and vomiting.   Endocrine: Negative.    Genitourinary: Negative.  Negative for difficulty urinating, dysuria, frequency, hematuria and urgency.   Musculoskeletal:  Positive for arthralgias.        Bilateral arm pain   Skin: Negative.    Neurological: Negative.  Negative for dizziness, syncope, speech difficulty, weakness, light-headedness and headaches.   Psychiatric/Behavioral: Negative.       Objective:     Vital Signs (Most Recent):  Temp: 98 °F (36.7 °C) (06/22/25 2326)  Pulse: 93 (06/23/25 0406)  Resp: 16 (06/23/25 0313)  BP: 115/73 (06/23/25 0406)  SpO2: 95 % (06/23/25  0406) Vital Signs (24h Range):  Temp:  [98 °F (36.7 °C)] 98 °F (36.7 °C)  Pulse:  [85-97] 93  Resp:  [16] 16  SpO2:  [85 %-99 %] 95 %  BP: (107-118)/(70-75) 115/73     Weight: 53.1 kg (117 lb)  Body mass index is 21.4 kg/m².     Physical Exam  Vitals reviewed.   Constitutional:       Appearance: Normal appearance. She is ill-appearing. She is not toxic-appearing or diaphoretic.   HENT:      Head: Normocephalic.      Nose: Nose normal.      Mouth/Throat:      Mouth: Mucous membranes are moist.      Pharynx: Oropharynx is clear.   Eyes:      Extraocular Movements: Extraocular movements intact.      Pupils: Pupils are equal, round, and reactive to light.   Cardiovascular:      Rate and Rhythm: Normal rate and regular rhythm.      Pulses: Normal pulses.      Heart sounds: Normal heart sounds.   Pulmonary:      Effort: Pulmonary effort is normal. No respiratory distress.      Breath sounds: Normal breath sounds. No wheezing or rales.   Chest:      Chest wall: No tenderness.   Abdominal:      General: Bowel sounds are normal. There is no distension.      Palpations: Abdomen is soft.      Tenderness: There is no abdominal tenderness. There is no right CVA tenderness, left CVA tenderness, guarding or rebound.   Musculoskeletal:      Cervical back: Neck supple.      Right lower leg: No edema.      Left lower leg: No edema.   Skin:     General: Skin is warm and dry.      Capillary Refill: Capillary refill takes less than 2 seconds.   Neurological:      General: No focal deficit present.      Mental Status: She is alert and oriented to person, place, and time.   Psychiatric:         Mood and Affect: Mood normal.         Behavior: Behavior normal.              CRANIAL NERVES     CN III, IV, VI   Pupils are equal, round, and reactive to light.       Significant Labs: All pertinent labs within the past 24 hours have been reviewed.    Bilirubin:   Recent Labs   Lab 06/23/25  0154   BILITOT 2.8*     CBC:   Recent Labs   Lab  06/23/25  0123   WBC 14.86*   HGB 12.1   HCT 32.8*        RETIC 5.2    CMP:   Recent Labs   Lab 06/23/25  0154      K 3.6      CO2 19*   GLU 83   BUN 8   CREATININE 0.6   CALCIUM 9.1   PROT 8.2   ALBUMIN 3.9   BILITOT 2.8*   ALKPHOS 69   AST 41   ALT 29   ANIONGAP 11       Significant Imaging: I have reviewed all pertinent imaging results/findings within the past 24 hours.    Assessment/Plan:     Assessment & Plan  Sickle cell pain crisis  Anemia is likely due to chronic disease due to sickle cell disease. Most recent hemoglobin and hematocrit are listed below.  Recent Labs     06/23/25  0123 06/23/25  0541   HGB 12.1 10.3*   HCT 32.8* 27.8*     Plan  -Monitor serial CBC: Daily  -Transfuse PRBC if patient becomes hemodynamically unstable, symptomatic or H/H drops below 7/21.  -Dilaudid PCA ordered  -PRN Zofran, phenergan  -Hydrate with IV fluids    Hyperbilirubinemia  Monitor labs    Hypokalemia  Patient's most recent potassium results are listed below.   Recent Labs     06/23/25  0154 06/23/25  0634   K 3.6 3.4*     Plan  -Replete potassium per protocol  -Monitor potassium Daily      VTE Risk Mitigation (From admission, onward)           Ordered     IP VTE LOW RISK PATIENT  Once         06/23/25 0456     Place sequential compression device  Until discontinued         06/23/25 0456                   This patient's case has been reviewed by my supervising physician, Dr. James Kay.  Supervising physician will provide additional orders and recommendations at his or her discretion.       Ileana Schmitt NP  Department of Hospital Medicine  [facility] - Emergency Dept.

## 2025-06-24 LAB
ABSOLUTE EOSINOPHIL (OHS): 0.48 K/UL
ABSOLUTE MONOCYTE (OHS): 0.88 K/UL (ref 0.3–1)
ABSOLUTE NEUTROPHIL COUNT (OHS): 4.76 K/UL (ref 1.8–7.7)
ALBUMIN SERPL BCP-MCNC: 3.4 G/DL (ref 3.5–5.2)
ALP SERPL-CCNC: 78 UNIT/L (ref 40–150)
ALT SERPL W/O P-5'-P-CCNC: 20 UNIT/L (ref 10–44)
ANION GAP (OHS): 10 MMOL/L (ref 8–16)
AST SERPL-CCNC: 24 UNIT/L (ref 11–45)
BACTERIA UR CULT: NORMAL
BASOPHILS # BLD AUTO: 0.12 K/UL
BASOPHILS NFR BLD AUTO: 1.1 %
BILIRUB SERPL-MCNC: 2.1 MG/DL (ref 0.1–1)
BUN SERPL-MCNC: 7 MG/DL (ref 6–20)
CALCIUM SERPL-MCNC: 8.5 MG/DL (ref 8.7–10.5)
CHLORIDE SERPL-SCNC: 108 MMOL/L (ref 95–110)
CO2 SERPL-SCNC: 21 MMOL/L (ref 23–29)
CREAT SERPL-MCNC: 0.6 MG/DL (ref 0.5–1.4)
ERYTHROCYTE [DISTWIDTH] IN BLOOD BY AUTOMATED COUNT: 14.9 % (ref 11.5–14.5)
GFR SERPLBLD CREATININE-BSD FMLA CKD-EPI: >60 ML/MIN/1.73/M2
GLUCOSE SERPL-MCNC: 89 MG/DL (ref 70–110)
HCT VFR BLD AUTO: 30.8 % (ref 37–48.5)
HGB BLD-MCNC: 11.2 GM/DL (ref 12–16)
IMM GRANULOCYTES # BLD AUTO: 0.07 K/UL (ref 0–0.04)
IMM GRANULOCYTES NFR BLD AUTO: 0.6 % (ref 0–0.5)
LYMPHOCYTES # BLD AUTO: 4.82 K/UL (ref 1–4.8)
MCH RBC QN AUTO: 30.1 PG (ref 27–31)
MCHC RBC AUTO-ENTMCNC: 36.4 G/DL (ref 32–36)
MCV RBC AUTO: 83 FL (ref 82–98)
NUCLEATED RBC (/100WBC) (OHS): 1 /100 WBC
PLATELET # BLD AUTO: 276 K/UL (ref 150–450)
PMV BLD AUTO: 10 FL (ref 9.2–12.9)
POTASSIUM SERPL-SCNC: 3.5 MMOL/L (ref 3.5–5.1)
PROT SERPL-MCNC: 7 GM/DL (ref 6–8.4)
RBC # BLD AUTO: 3.72 M/UL (ref 4–5.4)
RELATIVE EOSINOPHIL (OHS): 4.3 %
RELATIVE LYMPHOCYTE (OHS): 43.3 % (ref 18–48)
RELATIVE MONOCYTE (OHS): 7.9 % (ref 4–15)
RELATIVE NEUTROPHIL (OHS): 42.8 % (ref 38–73)
SODIUM SERPL-SCNC: 139 MMOL/L (ref 136–145)
WBC # BLD AUTO: 11.13 K/UL (ref 3.9–12.7)

## 2025-06-24 PROCEDURE — 36415 COLL VENOUS BLD VENIPUNCTURE: CPT | Performed by: NURSE PRACTITIONER

## 2025-06-24 PROCEDURE — 25000003 PHARM REV CODE 250: Performed by: NURSE PRACTITIONER

## 2025-06-24 PROCEDURE — 94761 N-INVAS EAR/PLS OXIMETRY MLT: CPT

## 2025-06-24 PROCEDURE — 82040 ASSAY OF SERUM ALBUMIN: CPT | Performed by: NURSE PRACTITIONER

## 2025-06-24 PROCEDURE — S5010 5% DEXTROSE AND 0.45% SALINE: HCPCS | Performed by: NURSE PRACTITIONER

## 2025-06-24 PROCEDURE — 63600175 PHARM REV CODE 636 W HCPCS: Performed by: HOSPITALIST

## 2025-06-24 PROCEDURE — 85025 COMPLETE CBC W/AUTO DIFF WBC: CPT | Performed by: NURSE PRACTITIONER

## 2025-06-24 PROCEDURE — 99900035 HC TECH TIME PER 15 MIN (STAT)

## 2025-06-24 PROCEDURE — 27000221 HC OXYGEN, UP TO 24 HOURS

## 2025-06-24 PROCEDURE — 94799 UNLISTED PULMONARY SVC/PX: CPT

## 2025-06-24 PROCEDURE — 21400001 HC TELEMETRY ROOM

## 2025-06-24 RX ADMIN — SENNOSIDES AND DOCUSATE SODIUM 1 TABLET: 50; 8.6 TABLET ORAL at 09:06

## 2025-06-24 RX ADMIN — FOLIC ACID 1 MG: 1 TABLET ORAL at 08:06

## 2025-06-24 RX ADMIN — Medication: at 05:06

## 2025-06-24 RX ADMIN — DEXTROSE AND SODIUM CHLORIDE: 5; 450 INJECTION, SOLUTION INTRAVENOUS at 08:06

## 2025-06-24 RX ADMIN — HYDROXYUREA 1500 MG: 500 CAPSULE ORAL at 08:06

## 2025-06-24 RX ADMIN — CETIRIZINE HYDROCHLORIDE 10 MG: 10 TABLET, FILM COATED ORAL at 06:06

## 2025-06-24 RX ADMIN — SENNOSIDES AND DOCUSATE SODIUM 1 TABLET: 50; 8.6 TABLET ORAL at 08:06

## 2025-06-24 RX ADMIN — DEXTROSE AND SODIUM CHLORIDE: 5; 450 INJECTION, SOLUTION INTRAVENOUS at 12:06

## 2025-06-24 NOTE — ASSESSMENT & PLAN NOTE
Anemia is likely due to chronic disease due to sickle cell disease. Most recent hemoglobin and hematocrit are listed below.  Recent Labs     06/23/25  0123 06/23/25  0541 06/24/25  0601   HGB 12.1 10.3* 11.2*   HCT 32.8* 27.8* 30.8*     Plan  -Monitor serial CBC: Daily  -Transfuse PRBC if patient becomes hemodynamically unstable, symptomatic or H/H drops below 7/21.  -Dilaudid PCA ordered  -PRN Zofran, phenergan  -Hydrate with IV fluids

## 2025-06-24 NOTE — PHARMACY MED REC
"Admission Medication History     The home medication history was taken by Celso Barraza.    You may go to "Admission" then "Reconcile Home Medications" tabs to review and/or act upon these items.     The home medication list has been updated by the Pharmacy department.   Please read ALL comments highlighted in yellow.   Please address this information as you see fit.    Feel free to contact us if you have any questions or require assistance.      Medications listed below were obtained from: Analytic software- [employer], Medical records, and Patient's pharmacy  PTA Medications   Medication Sig    cetirizine (ZYRTEC) 10 MG tablet Take 10 mg by mouth every morning.    hydroxyurea (HYDREA) 500 mg Cap Take 1,500 mg by mouth.    lisdexamfetamine (VYVANSE) 30 MG capsule Take 30 mg by mouth every morning.    oxyCODONE-acetaminophen (PERCOCET) 7.5-325 mg per tablet Take 1 tablet by mouth every 8 (eight) hours as needed.    ergocalciferol (ERGOCALCIFEROL) 50,000 unit Cap Take 50,000 Units by mouth every 7 days.    folic acid (FOLVITE) 1 MG tablet Take 1 mg by mouth once daily.    senna-docusate 8.6-50 mg (PERICOLACE) 8.6-50 mg per tablet Take 1 tablet by mouth 2 (two) times daily.       Celso Barraza  [id number]                  .          "

## 2025-06-24 NOTE — ASSESSMENT & PLAN NOTE
Patient's most recent potassium results are listed below.   Recent Labs     06/23/25  0154 06/23/25  0634 06/24/25  0601   K 3.6 3.4* 3.5     Plan  -Replete potassium per protocol  -Monitor potassium Daily

## 2025-06-24 NOTE — PLAN OF CARE
Updated patient on plan of care. Instructed patient to use call light for assistance, call light in reach. Hourly rounding performed. Vitals q4 hours. Education provided, questions answered/encouraged. Chart check complete.   Problem: Adult Inpatient Plan of Care  Goal: Plan of Care Review  Outcome: Progressing  Goal: Patient-Specific Goal (Individualized)  Outcome: Progressing  Goal: Absence of Hospital-Acquired Illness or Injury  Outcome: Progressing  Goal: Optimal Comfort and Wellbeing  Outcome: Progressing  Goal: Readiness for Transition of Care  Outcome: Progressing     Problem: Infection  Goal: Absence of Infection Signs and Symptoms  Outcome: Progressing     Problem: Fall Injury Risk  Goal: Absence of Fall and Fall-Related Injury  Outcome: Progressing

## 2025-06-24 NOTE — HOSPITAL COURSE
31yo female admitted on 6/23 for sickle cell pain crisis. Was started on PCA pump with great improvement in pain.  Much improvement noted with Potassium, H/H, and T-arnaldo.  Vital signs remain stable since admission.  She denies any N/V/ rash.  Patient reports pain has improved and would like to be discharged.  Prescriptions given for PO pain medications.  This patient has been seen and examined on the date of discharge and determined suitable for discharge.

## 2025-06-24 NOTE — PLAN OF CARE
[facility] - Med Surg  Initial Discharge Assessment       Primary Care Provider: Maddie, Primary Doctor    Admission Diagnosis: Sickle cell anemia with pain [D57.00]  Chest pain [R07.9]    Admission Date: 6/23/2025  Expected Discharge Date: Per Attending     Transition of Care Barriers: Underinsured    Payor: MEDICAID / Plan: [facility] COMMUNITY PLAN [facility] ([place] MEDICAID) / Product Type: Managed Medicaid /     Extended Emergency Contact Information  Primary Emergency Contact: Erum Zuleta  Mobile Phone: [phone]  Relation: Mother    Discharge Plan A: Home with family       No Pharmacies Listed    Initial Assessment (most recent)       Adult Discharge Assessment - 06/24/25 1316          Discharge Assessment    Assessment Type Discharge Planning Assessment     Confirmed/corrected address, phone number and insurance Yes     Confirmed Demographics Correct on Facesheet     Source of Information patient     People in Home parent(s)     Name(s) of People in Home Erum Newton     Do you expect to return to your current living situation? Yes     Do you have help at home or someone to help you manage your care at home? Yes     Who are your caregiver(s) and their phone number(s)? mother     Prior to hospitilization cognitive status: Alert/Oriented     Current cognitive status: Alert/Oriented     Walking or Climbing Stairs Difficulty no     Dressing/Bathing Difficulty no     Home Layout Able to live on 1st floor     Equipment Currently Used at Home none     Readmission within 30 days? No     Patient currently being followed by outpatient case management? No     Do you currently have service(s) that help you manage your care at home? No     Do you take prescription medications? Yes     Do you have prescription coverage? Yes     Do you have any problems affording any of your prescribed medications? No     Is the patient taking medications as prescribed? yes     Who is going to help you get home at discharge? mother     How do you  get to doctors appointments? car, drives self     Are you on dialysis? No     Do you take coumadin? No     Discharge Plan A Home with family     DME Needed Upon Discharge  none     Discharge Plan discussed with: Patient     Transition of Care Barriers Underinsured                   Anticipated DC dispo: Home   Prior Level of Function: Independent   People in home:  Mother     Comments:  CM met with patient at bedside to introduce role and discuss discharge planning. Mother, Erum, will be help at home and can provide transport at time of discharge. CM discharge needs depends on hospital progress. CM will continue following to assist with other needs.

## 2025-06-24 NOTE — SUBJECTIVE & OBJECTIVE
Interval History: improvement in pain today    Review of Systems   Constitutional:  Negative for activity change, appetite change, diaphoresis and fatigue.   HENT:  Negative for congestion.    Respiratory:  Negative for cough and shortness of breath.    Cardiovascular:  Negative for chest pain, palpitations and leg swelling.   Gastrointestinal:  Negative for abdominal distention, abdominal pain, constipation, diarrhea, nausea and vomiting.   Musculoskeletal:  Positive for myalgias. Negative for back pain, gait problem, joint swelling and neck pain.   Skin:  Negative for color change and pallor.   Neurological:  Negative for dizziness, syncope and weakness.   Psychiatric/Behavioral:  Negative for agitation and confusion.      Objective:     Vital Signs (Most Recent):  Temp: 98.3 °F (36.8 °C) (06/24/25 0908)  Pulse: 94 (06/24/25 0908)  Resp: 18 (06/24/25 0908)  BP: (!) 99/55 (06/24/25 0908)  SpO2: 97 % (06/24/25 0908) Vital Signs (24h Range):  Temp:  [98 °F (36.7 °C)-98.5 °F (36.9 °C)] 98.3 °F (36.8 °C)  Pulse:  [70-96] 94  Resp:  [12-19] 18  SpO2:  [97 %-100 %] 97 %  BP: ()/(55-73) 99/55     Weight: 53.1 kg (117 lb)  Body mass index is 21.4 kg/m².    Intake/Output Summary (Last 24 hours) at 6/24/2025 1009  Last data filed at 6/24/2025 0714  Gross per 24 hour   Intake 2526.18 ml   Output --   Net 2526.18 ml         Physical Exam  Constitutional:       Appearance: Normal appearance.   HENT:      Mouth/Throat:      Mouth: Mucous membranes are moist.      Pharynx: Oropharynx is clear.   Eyes:      Pupils: Pupils are equal, round, and reactive to light.   Cardiovascular:      Rate and Rhythm: Normal rate and regular rhythm.      Heart sounds: Normal heart sounds. No murmur heard.  Pulmonary:      Effort: Pulmonary effort is normal. No respiratory distress.      Breath sounds: Normal breath sounds. No wheezing.   Abdominal:      General: Abdomen is flat. Bowel sounds are normal.   Musculoskeletal:         General:  Normal range of motion.      Cervical back: Normal range of motion.   Skin:     General: Skin is warm and dry.      Capillary Refill: Capillary refill takes less than 2 seconds.      Coloration: Skin is not jaundiced or pale.   Neurological:      General: No focal deficit present.      Mental Status: She is alert and oriented to person, place, and time.   Psychiatric:         Mood and Affect: Mood normal.         Behavior: Behavior normal.         Thought Content: Thought content normal.         Judgment: Judgment normal.               Significant Labs:   Results for orders placed or performed during the hospital encounter of 06/23/25   Reticulocytes    Collection Time: 06/23/25  1:23 AM   Result Value Ref Range    Retic Count, Automated 5.2 (H) 0.5 - 2.5 %   CBC with Differential    Collection Time: 06/23/25  1:23 AM   Result Value Ref Range    WBC 14.86 (H) 3.90 - 12.70 K/uL    RBC 4.00 4.00 - 5.40 M/uL    HGB 12.1 12.0 - 16.0 gm/dL    HCT 32.8 (L) 37.0 - 48.5 %    MCV 82 82 - 98 fL    MCH 30.3 27.0 - 31.0 pg    MCHC 36.9 (H) 32.0 - 36.0 g/dL    RDW 15.3 (H) 11.5 - 14.5 %    Platelet Count 304 150 - 450 K/uL    MPV 10.5 9.2 - 12.9 fL    Nucleated RBC 1 (H) <=0 /100 WBC    Neut % 57.2 38 - 73 %    Lymph % 29.4 18 - 48 %    Mono % 10.0 4 - 15 %    Eos % 2.0 <=8 %    Basophil % 0.9 <=1.9 %    Imm Grans % 0.5 0.0 - 0.5 %    Neut # 8.49 (H) 1.8 - 7.7 K/uL    Lymph # 4.37 1 - 4.8 K/uL    Mono # 1.49 (H) 0.3 - 1 K/uL    Eos # 0.30 <=0.5 K/uL    Baso # 0.14 <=0.2 K/uL    Imm Grans # 0.07 (H) 0.00 - 0.04 K/uL   Morphology    Collection Time: 06/23/25  1:23 AM    Specimen: Blood   Result Value Ref Range    Platelet Estimate Appears Normal      Fragmented Cells Occasional     Polychromasia Occasional     Target Cell Occasional     Stomatocytes Present     Spherocyte Occasional    Comprehensive metabolic panel    Collection Time: 06/23/25  1:54 AM   Result Value Ref Range    Sodium 137 136 - 145 mmol/L    Potassium 3.6 3.5 -  5.1 mmol/L    Chloride 107 95 - 110 mmol/L    CO2 19 (L) 23 - 29 mmol/L    Glucose 83 70 - 110 mg/dL    BUN 8 6 - 20 mg/dL    Creatinine 0.6 0.5 - 1.4 mg/dL    Calcium 9.1 8.7 - 10.5 mg/dL    Protein Total 8.2 6.0 - 8.4 gm/dL    Albumin 3.9 3.5 - 5.2 g/dL    Bilirubin Total 2.8 (H) 0.1 - 1.0 mg/dL    ALP 69 40 - 150 unit/L    AST 41 11 - 45 unit/L    ALT 29 10 - 44 unit/L    Anion Gap 11 8 - 16 mmol/L    eGFR >60 >60 mL/min/1.73/m2   CBC with Differential    Collection Time: 06/23/25  5:41 AM   Result Value Ref Range    WBC 13.24 (H) 3.90 - 12.70 K/uL    RBC 3.40 (L) 4.00 - 5.40 M/uL    HGB 10.3 (L) 12.0 - 16.0 gm/dL    HCT 27.8 (L) 37.0 - 48.5 %    MCV 82 82 - 98 fL    MCH 30.3 27.0 - 31.0 pg    MCHC 37.1 (H) 32.0 - 36.0 g/dL    RDW 14.8 (H) 11.5 - 14.5 %    Platelet Count 251 150 - 450 K/uL    MPV 9.9 9.2 - 12.9 fL    Nucleated RBC 1 (H) <=0 /100 WBC    Neut % 51.2 38 - 73 %    Lymph % 37.2 18 - 48 %    Mono % 8.7 4 - 15 %    Eos % 1.5 <=8 %    Basophil % 0.9 <=1.9 %    Imm Grans % 0.5 0.0 - 0.5 %    Neut # 6.78 1.8 - 7.7 K/uL    Lymph # 4.93 (H) 1 - 4.8 K/uL    Mono # 1.15 (H) 0.3 - 1 K/uL    Eos # 0.20 <=0.5 K/uL    Baso # 0.12 <=0.2 K/uL    Imm Grans # 0.06 (H) 0.00 - 0.04 K/uL   Comprehensive Metabolic Panel (CMP)    Collection Time: 06/23/25  6:34 AM   Result Value Ref Range    Sodium 137 136 - 145 mmol/L    Potassium 3.4 (L) 3.5 - 5.1 mmol/L    Chloride 106 95 - 110 mmol/L    CO2 20 (L) 23 - 29 mmol/L    Glucose 217 (H) 70 - 110 mg/dL    BUN 7 6 - 20 mg/dL    Creatinine 0.6 0.5 - 1.4 mg/dL    Calcium 8.8 8.7 - 10.5 mg/dL    Protein Total 7.8 6.0 - 8.4 gm/dL    Albumin 3.8 3.5 - 5.2 g/dL    Bilirubin Total 3.4 (H) 0.1 - 1.0 mg/dL    ALP 66 40 - 150 unit/L    AST 40 11 - 45 unit/L    ALT 25 10 - 44 unit/L    Anion Gap 11 8 - 16 mmol/L    eGFR >60 >60 mL/min/1.73/m2   Urinalysis, Reflex to Urine Culture Urine, Clean Catch    Collection Time: 06/23/25  1:03 PM    Specimen: Urine   Result Value Ref Range     Color, UA Yellow Straw, Adelina, Yellow, Light-Orange    Appearance, UA Hazy (A) Clear    pH, UA 6.0 5.0 - 8.0    Spec Grav UA 1.015 1.005 - 1.030    Protein, UA Negative Negative    Glucose, UA Negative Negative    Ketones, UA Negative Negative    Bilirubin, UA Negative Negative    Blood, UA Negative Negative    Nitrites, UA Negative Negative    Urobilinogen, UA Negative <2.0 EU/dL    Leukocyte Esterase, UA 2+ (A) Negative   Pregnancy, urine rapid    Collection Time: 06/23/25  1:03 PM   Result Value Ref Range    hCG Qualitative, Urine Negative Negative   Drug screen panel, emergency    Collection Time: 06/23/25  1:03 PM   Result Value Ref Range    Benzodiazepine, Urine Negative Negative    Methadone, Urine Negative Negative    Cocaine, Urine Negative Negative    Opiates, Urine Presumptive Positive (A) Negative    Barbiturates, Urine Negative Negative    Amphetamines, Urine Negative Negative    THC Negative Negative    Phencyclidine, Urine Negative Negative    Urine Creatinine 116.5 15.0 - 325.0 mg/dL   GRE[place] URINE HOLD    Collection Time: 06/23/25  1:03 PM   Result Value Ref Range    Extra Tube Hold for add-ons.    Urinalysis Microscopic    Collection Time: 06/23/25  1:03 PM   Result Value Ref Range    WBC, UA 24 (H) 0 - 5 /HPF    Bacteria, UA Moderate (A) None, Rare, Occasional /HPF    Squamous Epithelial Cells, UA 5 /HPF    Microscopic Comment     Comprehensive Metabolic Panel (CMP)    Collection Time: 06/24/25  6:01 AM   Result Value Ref Range    Sodium 139 136 - 145 mmol/L    Potassium 3.5 3.5 - 5.1 mmol/L    Chloride 108 95 - 110 mmol/L    CO2 21 (L) 23 - 29 mmol/L    Glucose 89 70 - 110 mg/dL    BUN 7 6 - 20 mg/dL    Creatinine 0.6 0.5 - 1.4 mg/dL    Calcium 8.5 (L) 8.7 - 10.5 mg/dL    Protein Total 7.0 6.0 - 8.4 gm/dL    Albumin 3.4 (L) 3.5 - 5.2 g/dL    Bilirubin Total 2.1 (H) 0.1 - 1.0 mg/dL    ALP 78 40 - 150 unit/L    AST 24 11 - 45 unit/L    ALT 20 10 - 44 unit/L    Anion Gap 10 8 - 16 mmol/L    eGFR  >60 >60 mL/min/1.73/m2   CBC with Differential    Collection Time: 06/24/25  6:01 AM   Result Value Ref Range    WBC 11.13 3.90 - 12.70 K/uL    RBC 3.72 (L) 4.00 - 5.40 M/uL    HGB 11.2 (L) 12.0 - 16.0 gm/dL    HCT 30.8 (L) 37.0 - 48.5 %    MCV 83 82 - 98 fL    MCH 30.1 27.0 - 31.0 pg    MCHC 36.4 (H) 32.0 - 36.0 g/dL    RDW 14.9 (H) 11.5 - 14.5 %    Platelet Count 276 150 - 450 K/uL    MPV 10.0 9.2 - 12.9 fL    Nucleated RBC 1 (H) <=0 /100 WBC    Neut % 42.8 38 - 73 %    Lymph % 43.3 18 - 48 %    Mono % 7.9 4 - 15 %    Eos % 4.3 <=8 %    Basophil % 1.1 <=1.9 %    Imm Grans % 0.6 (H) 0.0 - 0.5 %    Neut # 4.76 1.8 - 7.7 K/uL    Lymph # 4.82 (H) 1 - 4.8 K/uL    Mono # 0.88 0.3 - 1 K/uL    Eos # 0.48 <=0.5 K/uL    Baso # 0.12 <=0.2 K/uL    Imm Grans # 0.07 (H) 0.00 - 0.04 K/uL        Significant Imaging:   Imaging Results    None

## 2025-06-24 NOTE — PLAN OF CARE
Discussed poc with pt, pt verbalized understanding    Purposeful rounding every 2hours    VS monitored as ordered    Cardiac monitoring in use, pt is NSR, tele monitor # [id number]    Fall precautions in place, remains injury free    Pain pump in use    IV infusing D5 1/2 NS @ 125    Accurate I&Os    Bed locked at lowest position    Call light within reach    Chart check complete    Will cont with POC

## 2025-06-24 NOTE — PROGRESS NOTES
[facility] Medicine  Progress Note    Patient Name: Francy Zuleta  MRN: [id number]  Patient Class: IP- Inpatient   Admission Date: 6/23/2025  Length of Stay: 1 days  Attending Physician: Denver Vera MD  Primary Care Provider: Maddie, Primary Doctor        Subjective     Principal Problem:Sickle cell pain crisis        HPI:    Patient is a 30-year-old female with past medical history significant for sickle cell disease, bipolar disorder, ADD, and use of medical marijuana who presented to ED with complaint of bilateral arm pain stating that she is having sickle cell crisis pain.  She reports that she normally takes Percocet to control her pain, however she has been taking it around the clock with no improvement.  Vital signs stable on room air.  She denies fever, chills, any recent illness, nasal congestion, sore throat, headaches, productive cough, nausea, vomiting, diarrhea, dysuria.  Lab workup showed WBC 14.86, hemoglobin 12.1, hematocrit 32.8, platelets 304, reticulocyte 5.2, total bilirubin 2.8, chemistry otherwise unremarkable. while in ED she was given IV Dilaudid 1 mg x 3 doses, IV Benadryl 25 mg, IV Phenergan 12.5 mg.  She continues to complain of pain and requests admission for pain control due to sickle cell pain crisis.    Overview/Hospital Course:  6/24/25  31yo female admitted overnight for sickle cell pain crisis.  Currently on PCA pump.  Reports improvement in pain since admission. Denies any N/V/ rash at this time.  Improvement in Potassium, H/H, and T-arnaldo noted. VSS     Interval History: improvement in pain today    Review of Systems   Constitutional:  Negative for activity change, appetite change, diaphoresis and fatigue.   HENT:  Negative for congestion.    Respiratory:  Negative for cough and shortness of breath.    Cardiovascular:  Negative for chest pain, palpitations and leg swelling.   Gastrointestinal:  Negative for abdominal distention, abdominal pain, constipation,  diarrhea, nausea and vomiting.   Musculoskeletal:  Positive for myalgias. Negative for back pain, gait problem, joint swelling and neck pain.   Skin:  Negative for color change and pallor.   Neurological:  Negative for dizziness, syncope and weakness.   Psychiatric/Behavioral:  Negative for agitation and confusion.      Objective:     Vital Signs (Most Recent):  Temp: 98.3 °F (36.8 °C) (06/24/25 0908)  Pulse: 94 (06/24/25 0908)  Resp: 18 (06/24/25 0908)  BP: (!) 99/55 (06/24/25 0908)  SpO2: 97 % (06/24/25 0908) Vital Signs (24h Range):  Temp:  [98 °F (36.7 °C)-98.5 °F (36.9 °C)] 98.3 °F (36.8 °C)  Pulse:  [70-96] 94  Resp:  [12-19] 18  SpO2:  [97 %-100 %] 97 %  BP: ()/(55-73) 99/55     Weight: 53.1 kg (117 lb)  Body mass index is 21.4 kg/m².    Intake/Output Summary (Last 24 hours) at 6/24/2025 1009  Last data filed at 6/24/2025 0714  Gross per 24 hour   Intake 2526.18 ml   Output --   Net 2526.18 ml         Physical Exam  Constitutional:       Appearance: Normal appearance.   HENT:      Mouth/Throat:      Mouth: Mucous membranes are moist.      Pharynx: Oropharynx is clear.   Eyes:      Pupils: Pupils are equal, round, and reactive to light.   Cardiovascular:      Rate and Rhythm: Normal rate and regular rhythm.      Heart sounds: Normal heart sounds. No murmur heard.  Pulmonary:      Effort: Pulmonary effort is normal. No respiratory distress.      Breath sounds: Normal breath sounds. No wheezing.   Abdominal:      General: Abdomen is flat. Bowel sounds are normal.   Musculoskeletal:         General: Normal range of motion.      Cervical back: Normal range of motion.   Skin:     General: Skin is warm and dry.      Capillary Refill: Capillary refill takes less than 2 seconds.      Coloration: Skin is not jaundiced or pale.   Neurological:      General: No focal deficit present.      Mental Status: She is alert and oriented to person, place, and time.   Psychiatric:         Mood and Affect: Mood normal.          Behavior: Behavior normal.         Thought Content: Thought content normal.         Judgment: Judgment normal.               Significant Labs:   Results for orders placed or performed during the hospital encounter of 06/23/25   Reticulocytes    Collection Time: 06/23/25  1:23 AM   Result Value Ref Range    Retic Count, Automated 5.2 (H) 0.5 - 2.5 %   CBC with Differential    Collection Time: 06/23/25  1:23 AM   Result Value Ref Range    WBC 14.86 (H) 3.90 - 12.70 K/uL    RBC 4.00 4.00 - 5.40 M/uL    HGB 12.1 12.0 - 16.0 gm/dL    HCT 32.8 (L) 37.0 - 48.5 %    MCV 82 82 - 98 fL    MCH 30.3 27.0 - 31.0 pg    MCHC 36.9 (H) 32.0 - 36.0 g/dL    RDW 15.3 (H) 11.5 - 14.5 %    Platelet Count 304 150 - 450 K/uL    MPV 10.5 9.2 - 12.9 fL    Nucleated RBC 1 (H) <=0 /100 WBC    Neut % 57.2 38 - 73 %    Lymph % 29.4 18 - 48 %    Mono % 10.0 4 - 15 %    Eos % 2.0 <=8 %    Basophil % 0.9 <=1.9 %    Imm Grans % 0.5 0.0 - 0.5 %    Neut # 8.49 (H) 1.8 - 7.7 K/uL    Lymph # 4.37 1 - 4.8 K/uL    Mono # 1.49 (H) 0.3 - 1 K/uL    Eos # 0.30 <=0.5 K/uL    Baso # 0.14 <=0.2 K/uL    Imm Grans # 0.07 (H) 0.00 - 0.04 K/uL   Morphology    Collection Time: 06/23/25  1:23 AM    Specimen: Blood   Result Value Ref Range    Platelet Estimate Appears Normal      Fragmented Cells Occasional     Polychromasia Occasional     Target Cell Occasional     Stomatocytes Present     Spherocyte Occasional    Comprehensive metabolic panel    Collection Time: 06/23/25  1:54 AM   Result Value Ref Range    Sodium 137 136 - 145 mmol/L    Potassium 3.6 3.5 - 5.1 mmol/L    Chloride 107 95 - 110 mmol/L    CO2 19 (L) 23 - 29 mmol/L    Glucose 83 70 - 110 mg/dL    BUN 8 6 - 20 mg/dL    Creatinine 0.6 0.5 - 1.4 mg/dL    Calcium 9.1 8.7 - 10.5 mg/dL    Protein Total 8.2 6.0 - 8.4 gm/dL    Albumin 3.9 3.5 - 5.2 g/dL    Bilirubin Total 2.8 (H) 0.1 - 1.0 mg/dL    ALP 69 40 - 150 unit/L    AST 41 11 - 45 unit/L    ALT 29 10 - 44 unit/L    Anion Gap 11 8 - 16 mmol/L    eGFR >60  >60 mL/min/1.73/m2   CBC with Differential    Collection Time: 06/23/25  5:41 AM   Result Value Ref Range    WBC 13.24 (H) 3.90 - 12.70 K/uL    RBC 3.40 (L) 4.00 - 5.40 M/uL    HGB 10.3 (L) 12.0 - 16.0 gm/dL    HCT 27.8 (L) 37.0 - 48.5 %    MCV 82 82 - 98 fL    MCH 30.3 27.0 - 31.0 pg    MCHC 37.1 (H) 32.0 - 36.0 g/dL    RDW 14.8 (H) 11.5 - 14.5 %    Platelet Count 251 150 - 450 K/uL    MPV 9.9 9.2 - 12.9 fL    Nucleated RBC 1 (H) <=0 /100 WBC    Neut % 51.2 38 - 73 %    Lymph % 37.2 18 - 48 %    Mono % 8.7 4 - 15 %    Eos % 1.5 <=8 %    Basophil % 0.9 <=1.9 %    Imm Grans % 0.5 0.0 - 0.5 %    Neut # 6.78 1.8 - 7.7 K/uL    Lymph # 4.93 (H) 1 - 4.8 K/uL    Mono # 1.15 (H) 0.3 - 1 K/uL    Eos # 0.20 <=0.5 K/uL    Baso # 0.12 <=0.2 K/uL    Imm Grans # 0.06 (H) 0.00 - 0.04 K/uL   Comprehensive Metabolic Panel (CMP)    Collection Time: 06/23/25  6:34 AM   Result Value Ref Range    Sodium 137 136 - 145 mmol/L    Potassium 3.4 (L) 3.5 - 5.1 mmol/L    Chloride 106 95 - 110 mmol/L    CO2 20 (L) 23 - 29 mmol/L    Glucose 217 (H) 70 - 110 mg/dL    BUN 7 6 - 20 mg/dL    Creatinine 0.6 0.5 - 1.4 mg/dL    Calcium 8.8 8.7 - 10.5 mg/dL    Protein Total 7.8 6.0 - 8.4 gm/dL    Albumin 3.8 3.5 - 5.2 g/dL    Bilirubin Total 3.4 (H) 0.1 - 1.0 mg/dL    ALP 66 40 - 150 unit/L    AST 40 11 - 45 unit/L    ALT 25 10 - 44 unit/L    Anion Gap 11 8 - 16 mmol/L    eGFR >60 >60 mL/min/1.73/m2   Urinalysis, Reflex to Urine Culture Urine, Clean Catch    Collection Time: 06/23/25  1:03 PM    Specimen: Urine   Result Value Ref Range    Color, UA Yellow Straw, Adelina, Yellow, Light-Orange    Appearance, UA Hazy (A) Clear    pH, UA 6.0 5.0 - 8.0    Spec Grav UA 1.015 1.005 - 1.030    Protein, UA Negative Negative    Glucose, UA Negative Negative    Ketones, UA Negative Negative    Bilirubin, UA Negative Negative    Blood, UA Negative Negative    Nitrites, UA Negative Negative    Urobilinogen, UA Negative <2.0 EU/dL    Leukocyte Esterase, UA 2+ (A)  Negative   Pregnancy, urine rapid    Collection Time: 06/23/25  1:03 PM   Result Value Ref Range    hCG Qualitative, Urine Negative Negative   Drug screen panel, emergency    Collection Time: 06/23/25  1:03 PM   Result Value Ref Range    Benzodiazepine, Urine Negative Negative    Methadone, Urine Negative Negative    Cocaine, Urine Negative Negative    Opiates, Urine Presumptive Positive (A) Negative    Barbiturates, Urine Negative Negative    Amphetamines, Urine Negative Negative    THC Negative Negative    Phencyclidine, Urine Negative Negative    Urine Creatinine 116.5 15.0 - 325.0 mg/dL   GREY [place] URINE HOLD    Collection Time: 06/23/25  1:03 PM   Result Value Ref Range    Extra Tube Hold for add-ons.    Urinalysis Microscopic    Collection Time: 06/23/25  1:03 PM   Result Value Ref Range    WBC, UA 24 (H) 0 - 5 /HPF    Bacteria, UA Moderate (A) None, Rare, Occasional /HPF    Squamous Epithelial Cells, UA 5 /HPF    Microscopic Comment     Comprehensive Metabolic Panel (CMP)    Collection Time: 06/24/25  6:01 AM   Result Value Ref Range    Sodium 139 136 - 145 mmol/L    Potassium 3.5 3.5 - 5.1 mmol/L    Chloride 108 95 - 110 mmol/L    CO2 21 (L) 23 - 29 mmol/L    Glucose 89 70 - 110 mg/dL    BUN 7 6 - 20 mg/dL    Creatinine 0.6 0.5 - 1.4 mg/dL    Calcium 8.5 (L) 8.7 - 10.5 mg/dL    Protein Total 7.0 6.0 - 8.4 gm/dL    Albumin 3.4 (L) 3.5 - 5.2 g/dL    Bilirubin Total 2.1 (H) 0.1 - 1.0 mg/dL    ALP 78 40 - 150 unit/L    AST 24 11 - 45 unit/L    ALT 20 10 - 44 unit/L    Anion Gap 10 8 - 16 mmol/L    eGFR >60 >60 mL/min/1.73/m2   CBC with Differential    Collection Time: 06/24/25  6:01 AM   Result Value Ref Range    WBC 11.13 3.90 - 12.70 K/uL    RBC 3.72 (L) 4.00 - 5.40 M/uL    HGB 11.2 (L) 12.0 - 16.0 gm/dL    HCT 30.8 (L) 37.0 - 48.5 %    MCV 83 82 - 98 fL    MCH 30.1 27.0 - 31.0 pg    MCHC 36.4 (H) 32.0 - 36.0 g/dL    RDW 14.9 (H) 11.5 - 14.5 %    Platelet Count 276 150 - 450 K/uL    MPV 10.0 9.2 - 12.9 fL     Nucleated RBC 1 (H) <=0 /100 WBC    Neut % 42.8 38 - 73 %    Lymph % 43.3 18 - 48 %    Mono % 7.9 4 - 15 %    Eos % 4.3 <=8 %    Basophil % 1.1 <=1.9 %    Imm Grans % 0.6 (H) 0.0 - 0.5 %    Neut # 4.76 1.8 - 7.7 K/uL    Lymph # 4.82 (H) 1 - 4.8 K/uL    Mono # 0.88 0.3 - 1 K/uL    Eos # 0.48 <=0.5 K/uL    Baso # 0.12 <=0.2 K/uL    Imm Grans # 0.07 (H) 0.00 - 0.04 K/uL        Significant Imaging:   Imaging Results    None            Assessment & Plan  Sickle cell pain crisis  Anemia is likely due to chronic disease due to sickle cell disease. Most recent hemoglobin and hematocrit are listed below.  Recent Labs     06/23/25  0123 06/23/25  0541 06/24/25  0601   HGB 12.1 10.3* 11.2*   HCT 32.8* 27.8* 30.8*     Plan  -Monitor serial CBC: Daily  -Transfuse PRBC if patient becomes hemodynamically unstable, symptomatic or H/H drops below 7/21.  -Dilaudid PCA ordered  -PRN Zofran, phenergan  -Hydrate with IV fluids    Hyperbilirubinemia  Improvement noted on labs today     Hypokalemia  Patient's most recent potassium results are listed below.   Recent Labs     06/23/25  0154 06/23/25  0634 06/24/25  0601   K 3.6 3.4* 3.5     Plan  -Replete potassium per protocol  -Monitor potassium Daily    VTE Risk Mitigation (From admission, onward)           Ordered     IP VTE LOW RISK PATIENT  Once         06/23/25 0456     Place sequential compression device  Until discontinued         06/23/25 0456                    Discharge Planning   XUAN:      Code Status: Full Code   Medical Readiness for Discharge Date:                            CRISTI Melton  Department of Hospital Medicine   [facility] - Med [facility]

## 2025-06-25 VITALS
BODY MASS INDEX: 21.53 KG/M2 | SYSTOLIC BLOOD PRESSURE: 108 MMHG | HEIGHT: 62 IN | DIASTOLIC BLOOD PRESSURE: 70 MMHG | WEIGHT: 117 LBS | RESPIRATION RATE: 18 BRPM | TEMPERATURE: 99 F | OXYGEN SATURATION: 98 % | HEART RATE: 74 BPM

## 2025-06-25 LAB
ABSOLUTE EOSINOPHIL (OHS): 0.43 K/UL
ABSOLUTE MONOCYTE (OHS): 0.8 K/UL (ref 0.3–1)
ABSOLUTE NEUTROPHIL COUNT (OHS): 5.01 K/UL (ref 1.8–7.7)
ALBUMIN SERPL BCP-MCNC: 3.6 G/DL (ref 3.5–5.2)
ALP SERPL-CCNC: 73 UNIT/L (ref 40–150)
ALT SERPL W/O P-5'-P-CCNC: 17 UNIT/L (ref 10–44)
ANION GAP (OHS): 7 MMOL/L (ref 8–16)
AST SERPL-CCNC: 24 UNIT/L (ref 11–45)
BASOPHILS # BLD AUTO: 0.1 K/UL
BASOPHILS NFR BLD AUTO: 0.9 %
BILIRUB SERPL-MCNC: 2.3 MG/DL (ref 0.1–1)
BUN SERPL-MCNC: 7 MG/DL (ref 6–20)
CALCIUM SERPL-MCNC: 8.9 MG/DL (ref 8.7–10.5)
CHLORIDE SERPL-SCNC: 105 MMOL/L (ref 95–110)
CO2 SERPL-SCNC: 24 MMOL/L (ref 23–29)
CREAT SERPL-MCNC: 0.7 MG/DL (ref 0.5–1.4)
ERYTHROCYTE [DISTWIDTH] IN BLOOD BY AUTOMATED COUNT: 14.8 % (ref 11.5–14.5)
GFR SERPLBLD CREATININE-BSD FMLA CKD-EPI: >60 ML/MIN/1.73/M2
GLUCOSE SERPL-MCNC: 97 MG/DL (ref 70–110)
HCT VFR BLD AUTO: 31.3 % (ref 37–48.5)
HGB BLD-MCNC: 11 GM/DL (ref 12–16)
IMM GRANULOCYTES # BLD AUTO: 0.04 K/UL (ref 0–0.04)
IMM GRANULOCYTES NFR BLD AUTO: 0.4 % (ref 0–0.5)
LYMPHOCYTES # BLD AUTO: 4.86 K/UL (ref 1–4.8)
MCH RBC QN AUTO: 29.3 PG (ref 27–31)
MCHC RBC AUTO-ENTMCNC: 36.1 G/DL (ref 32–36)
MCV RBC AUTO: 82 FL (ref 82–98)
NUCLEATED RBC (/100WBC) (OHS): 1 /100 WBC
PLATELET # BLD AUTO: 292 K/UL (ref 150–450)
PMV BLD AUTO: 10.2 FL (ref 9.2–12.9)
POTASSIUM SERPL-SCNC: 3.5 MMOL/L (ref 3.5–5.1)
PROT SERPL-MCNC: 7.3 GM/DL (ref 6–8.4)
RBC # BLD AUTO: 3.82 M/UL (ref 4–5.4)
RELATIVE EOSINOPHIL (OHS): 3.8 %
RELATIVE LYMPHOCYTE (OHS): 43.2 % (ref 18–48)
RELATIVE MONOCYTE (OHS): 7.1 % (ref 4–15)
RELATIVE NEUTROPHIL (OHS): 44.6 % (ref 38–73)
SODIUM SERPL-SCNC: 136 MMOL/L (ref 136–145)
WBC # BLD AUTO: 11.24 K/UL (ref 3.9–12.7)

## 2025-06-25 PROCEDURE — 85025 COMPLETE CBC W/AUTO DIFF WBC: CPT | Performed by: NURSE PRACTITIONER

## 2025-06-25 PROCEDURE — 80053 COMPREHEN METABOLIC PANEL: CPT | Performed by: NURSE PRACTITIONER

## 2025-06-25 PROCEDURE — 27000221 HC OXYGEN, UP TO 24 HOURS

## 2025-06-25 PROCEDURE — 25000003 PHARM REV CODE 250: Performed by: NURSE PRACTITIONER

## 2025-06-25 PROCEDURE — 99900035 HC TECH TIME PER 15 MIN (STAT)

## 2025-06-25 PROCEDURE — 36415 COLL VENOUS BLD VENIPUNCTURE: CPT | Performed by: NURSE PRACTITIONER

## 2025-06-25 PROCEDURE — S5010 5% DEXTROSE AND 0.45% SALINE: HCPCS | Performed by: NURSE PRACTITIONER

## 2025-06-25 RX ORDER — OXYCODONE AND ACETAMINOPHEN 7.5; 325 MG/1; MG/1
1 TABLET ORAL EVERY 8 HOURS PRN
Qty: 42 TABLET | Refills: 0 | Status: SHIPPED | OUTPATIENT
Start: 2025-06-25 | End: 2025-07-09

## 2025-06-25 RX ADMIN — CETIRIZINE HYDROCHLORIDE 10 MG: 10 TABLET, FILM COATED ORAL at 06:06

## 2025-06-25 RX ADMIN — DEXTROSE AND SODIUM CHLORIDE: 5; 450 INJECTION, SOLUTION INTRAVENOUS at 02:06

## 2025-06-25 RX ADMIN — SENNOSIDES AND DOCUSATE SODIUM 1 TABLET: 50; 8.6 TABLET ORAL at 08:06

## 2025-06-25 RX ADMIN — FOLIC ACID 1 MG: 1 TABLET ORAL at 08:06

## 2025-06-25 RX ADMIN — HYDROXYUREA 1500 MG: 500 CAPSULE ORAL at 08:06

## 2025-06-25 NOTE — PLAN OF CARE
Discharge education given. Pt verbalized an understanding. IV removed and catheter intact. Pt being discharged with bedside rx and personal belongings. Pt declined a wheel chair and is walking down to front lobby at this time.

## 2025-06-25 NOTE — ASSESSMENT & PLAN NOTE
Anemia is likely due to chronic disease due to sickle cell disease. Most recent hemoglobin and hematocrit are listed below.  Recent Labs     06/23/25  0541 06/24/25  0601 06/25/25  0527   HGB 10.3* 11.2* 11.0*   HCT 27.8* 30.8* 31.3*     Plan  -Monitor serial CBC: Daily  -Transfuse PRBC if patient becomes hemodynamically unstable, symptomatic or H/H drops below 7/21.  -Dilaudid PCA ordered  -PRN Zofran, phenergan  -Hydrate with IV fluids    - pain has resolved   - d/c with home pain medication regimen

## 2025-06-25 NOTE — DISCHARGE SUMMARY
[facility] Medicine  Discharge Summary      Patient Name: Francy Zuleta  MRN: [id number]  [facility]: [id number]  Patient Class: IP- Inpatient  Admission Date: 6/23/2025  Hospital Length of Stay: 2 days  Discharge Date and Time: 06/25/2025 12:23 PM  Attending Physician: Denver Vera MD   Discharging Provider: CRISTI Melton  Primary Care Provider: Maddie Primary Doctor    Primary Care Team: Networked reference to record PCT     HPI:     Patient is a 30-year-old female with past medical history significant for sickle cell disease, bipolar disorder, ADD, and use of medical marijuana who presented to ED with complaint of bilateral arm pain stating that she is having sickle cell crisis pain.  She reports that she normally takes Percocet to control her pain, however she has been taking it around the clock with no improvement.  Vital signs stable on room air.  She denies fever, chills, any recent illness, nasal congestion, sore throat, headaches, productive cough, nausea, vomiting, diarrhea, dysuria.  Lab workup showed WBC 14.86, hemoglobin 12.1, hematocrit 32.8, platelets 304, reticulocyte 5.2, total bilirubin 2.8, chemistry otherwise unremarkable. while in ED she was given IV Dilaudid 1 mg x 3 doses, IV Benadryl 25 mg, IV Phenergan 12.5 mg.  She continues to complain of pain and requests admission for pain control due to sickle cell pain crisis.    * No surgery found *      Hospital Course:   31yo female admitted on 6/23 for sickle cell pain crisis. Was started on PCA pump with great improvement in pain.  Much improvement noted with Potassium, H/H, and T-arnaldo.  Vital signs remain stable since admission.  She denies any N/V/ rash.  Patient reports pain has improved and would like to be discharged.  Prescriptions given for PO pain medications.  This patient has been seen and examined on the date of discharge and determined suitable for discharge.      Goals of Care Treatment Preferences:  Code Status:  Full Code         Consults:     Assessment & Plan  Sickle cell pain crisis  Anemia is likely due to chronic disease due to sickle cell disease. Most recent hemoglobin and hematocrit are listed below.  Recent Labs     06/23/25  0541 06/24/25  0601 06/25/25 0527   HGB 10.3* 11.2* 11.0*   HCT 27.8* 30.8* 31.3*     Plan  -Monitor serial CBC: Daily  -Transfuse PRBC if patient becomes hemodynamically unstable, symptomatic or H/H drops below 7/21.  -Dilaudid PCA ordered  -PRN Zofran, phenergan  -Hydrate with IV fluids    - pain has resolved   - d/c with home pain medication regimen     Hyperbilirubinemia  Improvement noted on labs today     Hypokalemia  Patient's most recent potassium results are listed below.   Recent Labs     06/23/25  0634 06/24/25 0601 06/25/25 0527   K 3.4* 3.5 3.5     Plan  -improvement noted on labs today   Final Active Diagnoses:    Diagnosis Date Noted POA    PRINCIPAL PROBLEM:  Sickle cell pain crisis [D57.00] 05/12/2024 Yes    Hypokalemia [E87.6] 06/23/2025 Yes    Hyperbilirubinemia [E80.6] 09/01/2024 Yes      Problems Resolved During this Admission:       Discharged Condition: good    Disposition: Home or Self Care    Follow Up:   Follow-up Information       No, Primary Doctor Follow up in 1 week(s).    Why: Hospital f/u             Kelsy Cheek, NP. Schedule an appointment as soon as possible for a visit in 1 week(s).    Specialty: Adult Health  Contact information:  [address] [facility] [address] [phone]                           Patient Instructions:      Diet Adult Regular     Notify your health care provider if you experience any of the following:  temperature >100.4     Notify your health care provider if you experience any of the following:  severe uncontrolled pain     Activity as tolerated       Significant Diagnostic Studies: Labs: BMP:   Recent Labs   Lab 06/24/25  0601 06/25/25 0527   GLU 89 97    136   K 3.5 3.5    105   CO2 21* 24   BUN 7 7    CREATININE 0.6 0.7   CALCIUM 8.5* 8.9   , CMP   Recent Labs   Lab 06/24/25  0601 06/25/25 0527    136   K 3.5 3.5    105   CO2 21* 24   GLU 89 97   BUN 7 7   CREATININE 0.6 0.7   CALCIUM 8.5* 8.9   PROT 7.0 7.3   ALBUMIN 3.4* 3.6   BILITOT 2.1* 2.3*   ALKPHOS 78 73   AST 24 24   ALT 20 17   ANIONGAP 10 7*   , and CBC   Recent Labs   Lab 06/24/25  0601 06/25/25 0527   WBC 11.13 11.24   HGB 11.2* 11.0*   HCT 30.8* 31.3*    292       Pending Diagnostic Studies:       None           Medications:  Reconciled Home Medications:      Medication List        START taking these medications      oxyCODONE-acetaminophen 7.5-325 mg per tablet  Commonly known as: PERCOCET  Take 1 tablet by mouth every 8 (eight) hours as needed for Pain.            CONTINUE taking these medications      cetirizine 10 MG tablet  Commonly known as: ZYRTEC  Take 10 mg by mouth every morning.     ergocalciferol 50,000 unit Cap  Commonly known as: ERGOCALCIFEROL  Take 50,000 Units by mouth every 7 days.     folic acid 1 MG tablet  Commonly known as: FOLVITE  Take 1 mg by mouth once daily.     hydroxyurea 500 mg Cap  Commonly known as: HYDREA  Take 1,500 mg by mouth.     lisdexamfetamine 30 MG capsule  Commonly known as: VYVANSE  Take 30 mg by mouth every morning.     senna-docusate 8.6-50 mg per tablet  Commonly known as: PERICOLACE  Take 1 tablet by mouth 2 (two) times daily.              Indwelling Lines/Drains at time of discharge:   Lines/Drains/Airways       None                       Time spent on the discharge of patient: 35 minutes         CRISTI Melton  Department of Hospital Medicine  [facility] Med [facility]

## 2025-06-25 NOTE — ASSESSMENT & PLAN NOTE
Patient's most recent potassium results are listed below.   Recent Labs     06/23/25  0634 06/24/25  0601 06/25/25  0527   K 3.4* 3.5 3.5     Plan  -improvement noted on labs today

## 2025-06-25 NOTE — PLAN OF CARE
[facility]  Discharge Final Note    Primary Care Provider: No, Primary Doctor    Expected Discharge Date: 6/25/2025    Final Discharge Note (most recent)       Final Note - 06/25/25 1054          Final Note    Assessment Type Final Discharge Note     Anticipated Discharge Disposition Home or Self Care        Post-Acute Status    Discharge Delays None known at this time                     Contact Info       No, Primary Doctor   Relationship: PCP - General        Next Steps: Follow up in 1 week(s)    Instructions: Hospital f/u    Kelsy Cheek NP   Specialty: Adult Health    [facility] [address]   Phone: [phone]       Next Steps: Schedule an appointment as soon as possible for a visit in 1 week(s)          Patient has no d/c needs at this time. Sw to follow up, as needed, for d/c planning purposes.